# Patient Record
Sex: FEMALE | Race: WHITE | Employment: FULL TIME | URBAN - METROPOLITAN AREA
[De-identification: names, ages, dates, MRNs, and addresses within clinical notes are randomized per-mention and may not be internally consistent; named-entity substitution may affect disease eponyms.]

---

## 2017-01-18 ENCOUNTER — TELEPHONE (OUTPATIENT)
Dept: BARIATRICS/WEIGHT MGMT | Age: 27
End: 2017-01-18

## 2017-03-06 RX ORDER — MONTELUKAST SODIUM 10 MG/1
TABLET ORAL
Qty: 90 TABLET | Refills: 1 | Status: SHIPPED | OUTPATIENT
Start: 2017-03-06 | End: 2018-08-08 | Stop reason: SDUPTHER

## 2017-04-18 RX ORDER — CITALOPRAM 40 MG/1
40 TABLET ORAL DAILY
Qty: 30 TABLET | Refills: 5 | Status: SHIPPED | OUTPATIENT
Start: 2017-04-18 | End: 2018-01-05 | Stop reason: SDUPTHER

## 2017-07-26 ENCOUNTER — OFFICE VISIT (OUTPATIENT)
Dept: FAMILY MEDICINE CLINIC | Age: 27
End: 2017-07-26

## 2017-07-26 VITALS
DIASTOLIC BLOOD PRESSURE: 84 MMHG | WEIGHT: 293 LBS | OXYGEN SATURATION: 98 % | SYSTOLIC BLOOD PRESSURE: 134 MMHG | HEIGHT: 67 IN | BODY MASS INDEX: 45.99 KG/M2 | HEART RATE: 90 BPM

## 2017-07-26 DIAGNOSIS — F41.9 ANXIETY: ICD-10-CM

## 2017-07-26 DIAGNOSIS — H69.82 EUSTACHIAN TUBE DYSFUNCTION, LEFT: ICD-10-CM

## 2017-07-26 DIAGNOSIS — G43.919 INTRACTABLE MIGRAINE WITHOUT STATUS MIGRAINOSUS, UNSPECIFIED MIGRAINE TYPE: ICD-10-CM

## 2017-07-26 DIAGNOSIS — H66.001 ACUTE SUPPURATIVE OTITIS MEDIA OF RIGHT EAR WITHOUT SPONTANEOUS RUPTURE OF TYMPANIC MEMBRANE, RECURRENCE NOT SPECIFIED: Primary | ICD-10-CM

## 2017-07-26 PROCEDURE — 99214 OFFICE O/P EST MOD 30 MIN: CPT | Performed by: NURSE PRACTITIONER

## 2017-07-26 RX ORDER — ALPRAZOLAM 0.5 MG/1
0.5 TABLET ORAL EVERY 8 HOURS PRN
Qty: 30 TABLET | Refills: 0 | Status: SHIPPED | OUTPATIENT
Start: 2017-07-26 | End: 2017-11-17 | Stop reason: SDUPTHER

## 2017-07-26 RX ORDER — RIZATRIPTAN BENZOATE 10 MG/1
10 TABLET ORAL
Qty: 12 TABLET | Refills: 3 | Status: SHIPPED | OUTPATIENT
Start: 2017-07-26 | End: 2018-08-08

## 2017-07-26 RX ORDER — AZITHROMYCIN 250 MG/1
TABLET, FILM COATED ORAL
Qty: 1 PACKET | Refills: 0 | Status: SHIPPED | OUTPATIENT
Start: 2017-07-26 | End: 2017-08-05

## 2017-07-26 RX ORDER — PREDNISONE 20 MG/1
40 TABLET ORAL DAILY
Qty: 10 TABLET | Refills: 0 | Status: SHIPPED | OUTPATIENT
Start: 2017-07-26 | End: 2017-07-31

## 2017-07-26 ASSESSMENT — ENCOUNTER SYMPTOMS
NAUSEA: 0
COUGH: 0
PHOTOPHOBIA: 1
SORE THROAT: 0
RHINORRHEA: 0
SINUS PRESSURE: 0
VOMITING: 0

## 2017-07-26 ASSESSMENT — PATIENT HEALTH QUESTIONNAIRE - PHQ9
SUM OF ALL RESPONSES TO PHQ QUESTIONS 1-9: 2
SUM OF ALL RESPONSES TO PHQ9 QUESTIONS 1 & 2: 2
2. FEELING DOWN, DEPRESSED OR HOPELESS: 0
1. LITTLE INTEREST OR PLEASURE IN DOING THINGS: 1
2. FEELING DOWN, DEPRESSED OR HOPELESS: 1
SUM OF ALL RESPONSES TO PHQ QUESTIONS 1-9: 0
SUM OF ALL RESPONSES TO PHQ9 QUESTIONS 1 & 2: 0
1. LITTLE INTEREST OR PLEASURE IN DOING THINGS: 0

## 2017-11-17 DIAGNOSIS — F41.9 ANXIETY: ICD-10-CM

## 2017-11-17 RX ORDER — ALPRAZOLAM 0.5 MG/1
0.5 TABLET ORAL EVERY 8 HOURS PRN
Qty: 30 TABLET | Refills: 0 | Status: SHIPPED | OUTPATIENT
Start: 2017-11-17 | End: 2018-08-08

## 2018-01-05 RX ORDER — CITALOPRAM 40 MG/1
TABLET ORAL
Qty: 30 TABLET | Refills: 0 | Status: SHIPPED | OUTPATIENT
Start: 2018-01-05 | End: 2018-02-22 | Stop reason: SDUPTHER

## 2018-02-22 DIAGNOSIS — F41.9 ANXIETY: ICD-10-CM

## 2018-02-22 RX ORDER — ALPRAZOLAM 0.5 MG/1
0.5 TABLET ORAL EVERY 8 HOURS PRN
Qty: 30 TABLET | Refills: 0 | OUTPATIENT
Start: 2018-02-22

## 2018-02-22 RX ORDER — CITALOPRAM 40 MG/1
TABLET ORAL
Qty: 30 TABLET | Refills: 0 | Status: SHIPPED | OUTPATIENT
Start: 2018-02-22 | End: 2018-04-09 | Stop reason: SDUPTHER

## 2018-02-22 NOTE — TELEPHONE ENCOUNTER
Last OV  07/26/2017 acute suppurative otitis media of right ear  Xanax  Last refill  11/17/2017 #30 0 refills     celexa   Last Refill  01/05/2018 #30 0 refills

## 2018-04-09 RX ORDER — CITALOPRAM 40 MG/1
TABLET ORAL
Qty: 30 TABLET | Refills: 0 | Status: SHIPPED | OUTPATIENT
Start: 2018-04-09 | End: 2018-05-18 | Stop reason: SDUPTHER

## 2018-05-21 RX ORDER — CITALOPRAM 40 MG/1
TABLET ORAL
Qty: 30 TABLET | Refills: 0 | Status: SHIPPED | OUTPATIENT
Start: 2018-05-21 | End: 2018-08-08 | Stop reason: ALTCHOICE

## 2018-08-08 ENCOUNTER — OFFICE VISIT (OUTPATIENT)
Dept: FAMILY MEDICINE CLINIC | Age: 28
End: 2018-08-08

## 2018-08-08 VITALS
DIASTOLIC BLOOD PRESSURE: 84 MMHG | HEART RATE: 96 BPM | BODY MASS INDEX: 45.99 KG/M2 | SYSTOLIC BLOOD PRESSURE: 114 MMHG | TEMPERATURE: 98.1 F | HEIGHT: 67 IN | WEIGHT: 293 LBS | OXYGEN SATURATION: 97 %

## 2018-08-08 DIAGNOSIS — F32.1 CURRENT MODERATE EPISODE OF MAJOR DEPRESSIVE DISORDER WITHOUT PRIOR EPISODE (HCC): ICD-10-CM

## 2018-08-08 DIAGNOSIS — F41.9 ANXIETY: Primary | ICD-10-CM

## 2018-08-08 PROCEDURE — G0444 DEPRESSION SCREEN ANNUAL: HCPCS | Performed by: FAMILY MEDICINE

## 2018-08-08 PROCEDURE — G8427 DOCREV CUR MEDS BY ELIG CLIN: HCPCS | Performed by: FAMILY MEDICINE

## 2018-08-08 PROCEDURE — 1036F TOBACCO NON-USER: CPT | Performed by: FAMILY MEDICINE

## 2018-08-08 PROCEDURE — G8417 CALC BMI ABV UP PARAM F/U: HCPCS | Performed by: FAMILY MEDICINE

## 2018-08-08 PROCEDURE — 99214 OFFICE O/P EST MOD 30 MIN: CPT | Performed by: FAMILY MEDICINE

## 2018-08-08 RX ORDER — RIZATRIPTAN BENZOATE 10 MG/1
10 TABLET ORAL
Qty: 9 TABLET | Refills: 3 | Status: SHIPPED | OUTPATIENT
Start: 2018-08-08 | End: 2019-02-04 | Stop reason: SDUPTHER

## 2018-08-08 RX ORDER — LORAZEPAM 1 MG/1
1 TABLET ORAL EVERY 6 HOURS PRN
Qty: 20 TABLET | Refills: 0 | Status: SHIPPED | OUTPATIENT
Start: 2018-08-08 | End: 2018-10-30 | Stop reason: SDUPTHER

## 2018-08-08 RX ORDER — ALPRAZOLAM 0.5 MG/1
0.5 TABLET ORAL EVERY 8 HOURS PRN
Qty: 30 TABLET | Refills: 0 | Status: CANCELLED | OUTPATIENT
Start: 2018-08-08 | End: 2018-09-07

## 2018-08-08 RX ORDER — CITALOPRAM 40 MG/1
TABLET ORAL
Qty: 90 TABLET | Refills: 1 | Status: CANCELLED | OUTPATIENT
Start: 2018-08-08

## 2018-08-08 RX ORDER — RIZATRIPTAN BENZOATE 10 MG/1
10 TABLET ORAL
COMMUNITY
End: 2018-08-08 | Stop reason: SDUPTHER

## 2018-08-08 RX ORDER — CITALOPRAM 40 MG/1
TABLET ORAL
Qty: 30 TABLET | Refills: 0 | Status: CANCELLED | OUTPATIENT
Start: 2018-08-08

## 2018-08-08 RX ORDER — MONTELUKAST SODIUM 10 MG/1
TABLET ORAL
Qty: 90 TABLET | Refills: 3 | Status: SHIPPED | OUTPATIENT
Start: 2018-08-08 | End: 2019-02-18 | Stop reason: SDUPTHER

## 2018-08-08 ASSESSMENT — PATIENT HEALTH QUESTIONNAIRE - PHQ9
2. FEELING DOWN, DEPRESSED OR HOPELESS: 3
5. POOR APPETITE OR OVEREATING: 0
4. FEELING TIRED OR HAVING LITTLE ENERGY: 0
7. TROUBLE CONCENTRATING ON THINGS, SUCH AS READING THE NEWSPAPER OR WATCHING TELEVISION: 3
SUM OF ALL RESPONSES TO PHQ QUESTIONS 1-9: 11
6. FEELING BAD ABOUT YOURSELF - OR THAT YOU ARE A FAILURE OR HAVE LET YOURSELF OR YOUR FAMILY DOWN: 1
3. TROUBLE FALLING OR STAYING ASLEEP: 1
SUM OF ALL RESPONSES TO PHQ QUESTIONS 1-9: 11
1. LITTLE INTEREST OR PLEASURE IN DOING THINGS: 0
10. IF YOU CHECKED OFF ANY PROBLEMS, HOW DIFFICULT HAVE THESE PROBLEMS MADE IT FOR YOU TO DO YOUR WORK, TAKE CARE OF THINGS AT HOME, OR GET ALONG WITH OTHER PEOPLE: 1
9. THOUGHTS THAT YOU WOULD BE BETTER OFF DEAD, OR OF HURTING YOURSELF: 0
8. MOVING OR SPEAKING SO SLOWLY THAT OTHER PEOPLE COULD HAVE NOTICED. OR THE OPPOSITE, BEING SO FIGETY OR RESTLESS THAT YOU HAVE BEEN MOVING AROUND A LOT MORE THAN USUAL: 3
SUM OF ALL RESPONSES TO PHQ9 QUESTIONS 1 & 2: 3
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
## 2018-08-08 NOTE — LETTER
supplements and oral decongestants. Dependence withdrawal symptoms may include depressed mood, loss of interest, suicidal thoughts, anxiety, fatigue, appetite changes and agitation. Testosterone replacement therapy:  Potential side effects include increased risk of stroke and heart attack, blood clots, increased blood pressure, increased cholesterol, enlarged prostate, sleep apnea, irritability/aggression and other mood disorders, and decreased fertility. Other:     1. I understand that I have the following responsibilities:  · I will take medications at the dose and frequency prescribed. · I will not increase or change how I take my medications without the approval of the health care provider who signs this Medication Agreement. · I will arrange for refills at the prescribed interval ONLY during regular office hours. I will not ask for refills earlier than agreed, after-hours, on holidays or on weekends. · I will obtain all refills for these medications at  ·  ____________________________________  pharmacy (phone number  ·  ________________________), with full consent for my provider and pharmacist to exchange information in writing or verbally. · I will not request any pain medications or controlled substances from other providers and will inform this provider of all other medications I am taking. · I will inform my other health care providers that I am taking these medications and of the existence of this [place] [id number]. In the event of an emergency, I will provide the same information to the emergency department providers. · I will protect my prescriptions and medications. I understand that lost or misplaced prescriptions will not be replaced. · I will keep medications only for my own use and will not share them with others. I will keep all medications away from children. · I agree to participate in any medical, psychological or psychiatric assessments recommended by my provider. · I will actively participate in any program designed to improve function, including social, physical, psychological and daily or work activities. 2. I will not use illegal or street drugs or another person's prescription. If I have an addiction problem with drugs or alcohol and my provider asks me to enter a program to address this issue, I agree to follow through. Such programs may include:  · 12-Step program and securing a sponsor  · Individual counseling   · Inpatient or outpatient treatment  · Other:_____________________________________________________________________________________________________________________________________________    If in treatment, I will request that a copy of the programs initial evaluation and treatment recommendations be sent to this provider and will not expect refills until that is received. I will also request written monthly updates be sent to this provider to verify my continuing treatment. 3. I will consent to drug screening upon my providers request to assure I am only taking the prescribed drugs, described in this MEDICATION AGREEMENT. I understand that a drug screen is a laboratory test in which a sample of my urine, blood or saliva is checked to see what drugs I have been taking. 4. I agree that I will treat the providers and staff at this office with respect at all times. I will keep all of my scheduled appointments, but if I need to cancel my appointment, I will do so a minimum of 24 hours before it is scheduled. 5. I understand that this provider may stop prescribing the medications listed if:  · I do not show any improvement in pain, or my activity has not improved. · I develop rapid tolerance or loss of improvement, as described in my treatment plan. · I develop significant side effects from the medication.   · My behavior is inconsistent with the responsibilities outlined above,

## 2018-10-30 DIAGNOSIS — F41.9 ANXIETY: ICD-10-CM

## 2018-10-30 RX ORDER — LORAZEPAM 1 MG/1
TABLET ORAL
Qty: 20 TABLET | Refills: 0 | Status: SHIPPED | OUTPATIENT
Start: 2018-10-30 | End: 2019-02-18 | Stop reason: SDUPTHER

## 2018-11-08 ENCOUNTER — HOSPITAL ENCOUNTER (EMERGENCY)
Age: 28
Discharge: HOME | End: 2018-11-08
Attending: EMERGENCY MEDICINE
Payer: COMMERCIAL

## 2018-11-08 VITALS
HEIGHT: 67 IN | HEART RATE: 100 BPM | RESPIRATION RATE: 16 BRPM | BODY MASS INDEX: 45.99 KG/M2 | OXYGEN SATURATION: 100 % | DIASTOLIC BLOOD PRESSURE: 100 MMHG | TEMPERATURE: 98.4 F | WEIGHT: 293 LBS | SYSTOLIC BLOOD PRESSURE: 145 MMHG

## 2018-11-08 DIAGNOSIS — T23.222A PARTIAL THICKNESS BURN OF FINGER OF LEFT HAND, INITIAL ENCOUNTER: Primary | ICD-10-CM

## 2018-11-08 PROCEDURE — 99282 EMERGENCY DEPT VISIT SF MDM: CPT

## 2018-11-08 PROCEDURE — 6370000000 HC RX 637 (ALT 250 FOR IP): Performed by: EMERGENCY MEDICINE

## 2018-11-08 RX ORDER — IBUPROFEN 600 MG/1
600 TABLET ORAL EVERY 6 HOURS PRN
Qty: 30 TABLET | Refills: 0 | Status: SHIPPED | OUTPATIENT
Start: 2018-11-08 | End: 2020-05-08

## 2018-11-08 RX ORDER — HYDROCODONE BITARTRATE AND ACETAMINOPHEN 5; 325 MG/1; MG/1
1 TABLET ORAL EVERY 6 HOURS PRN
Qty: 3 TABLET | Refills: 0 | Status: SHIPPED | OUTPATIENT
Start: 2018-11-08 | End: 2018-11-10

## 2018-11-08 RX ORDER — IBUPROFEN 400 MG/1
800 TABLET ORAL ONCE
Status: COMPLETED | OUTPATIENT
Start: 2018-11-08 | End: 2018-11-08

## 2018-11-08 RX ORDER — HYDROCODONE BITARTRATE AND ACETAMINOPHEN 5; 325 MG/1; MG/1
1 TABLET ORAL ONCE
Status: COMPLETED | OUTPATIENT
Start: 2018-11-08 | End: 2018-11-08

## 2018-11-08 RX ADMIN — HYDROCODONE BITARTRATE AND ACETAMINOPHEN 1 TABLET: 5; 325 TABLET ORAL at 21:53

## 2018-11-08 RX ADMIN — IBUPROFEN 800 MG: 400 TABLET, FILM COATED ORAL at 21:53

## 2018-11-08 ASSESSMENT — PAIN DESCRIPTION - ORIENTATION: ORIENTATION: LEFT

## 2018-11-08 ASSESSMENT — PAIN DESCRIPTION - DESCRIPTORS: DESCRIPTORS: BURNING

## 2018-11-08 ASSESSMENT — PAIN DESCRIPTION - LOCATION: LOCATION: FINGER (COMMENT WHICH ONE)

## 2018-11-08 ASSESSMENT — PAIN SCALES - GENERAL
PAINLEVEL_OUTOF10: 10
PAINLEVEL_OUTOF10: 8

## 2018-11-08 ASSESSMENT — PAIN DESCRIPTION - PAIN TYPE: TYPE: ACUTE PAIN

## 2018-11-16 ENCOUNTER — HOSPITAL ENCOUNTER (EMERGENCY)
Age: 28
Discharge: HOME | End: 2018-11-16
Attending: EMERGENCY MEDICINE
Payer: COMMERCIAL

## 2018-11-16 VITALS
TEMPERATURE: 97.8 F | RESPIRATION RATE: 17 BRPM | HEART RATE: 90 BPM | DIASTOLIC BLOOD PRESSURE: 90 MMHG | BODY MASS INDEX: 45.99 KG/M2 | WEIGHT: 293 LBS | HEIGHT: 67 IN | OXYGEN SATURATION: 98 % | SYSTOLIC BLOOD PRESSURE: 144 MMHG

## 2018-11-16 DIAGNOSIS — R03.0 ELEVATED BLOOD PRESSURE READING: ICD-10-CM

## 2018-11-16 DIAGNOSIS — K60.2 ANAL FISSURE: Primary | ICD-10-CM

## 2018-11-16 LAB
BILIRUBIN URINE: NEGATIVE
BLOOD, URINE: NEGATIVE
CLARITY: ABNORMAL
COLOR: YELLOW
GLUCOSE URINE: NEGATIVE MG/DL
KETONES, URINE: NEGATIVE MG/DL
LEUKOCYTE ESTERASE, URINE: NEGATIVE
MICROSCOPIC EXAMINATION: ABNORMAL
NITRITE, URINE: NEGATIVE
PH UA: 6
PROTEIN UA: NEGATIVE MG/DL
SPECIFIC GRAVITY UA: 1.02
URINE REFLEX TO CULTURE: ABNORMAL
URINE TYPE: ABNORMAL
UROBILINOGEN, URINE: 0.2 E.U./DL

## 2018-11-16 PROCEDURE — 81003 URINALYSIS AUTO W/O SCOPE: CPT

## 2018-11-16 PROCEDURE — 99283 EMERGENCY DEPT VISIT LOW MDM: CPT

## 2018-11-16 RX ORDER — DOCUSATE SODIUM 100 MG/1
100 CAPSULE, LIQUID FILLED ORAL 2 TIMES DAILY
Qty: 30 CAPSULE | Refills: 1 | Status: SHIPPED | OUTPATIENT
Start: 2018-11-16 | End: 2018-12-01

## 2018-11-16 NOTE — ED NOTES
Patient verbalized understanding of d/c instructions. Patient given scripts x 2. Patient left the ED and instructed on follow up.         Franki George RN  11/16/18 2294

## 2019-01-15 ENCOUNTER — HOSPITAL ENCOUNTER (EMERGENCY)
Age: 29
Discharge: HOME | End: 2019-01-15
Attending: EMERGENCY MEDICINE
Payer: COMMERCIAL

## 2019-01-15 VITALS
HEART RATE: 111 BPM | DIASTOLIC BLOOD PRESSURE: 96 MMHG | TEMPERATURE: 99.9 F | SYSTOLIC BLOOD PRESSURE: 148 MMHG | BODY MASS INDEX: 45.99 KG/M2 | WEIGHT: 293 LBS | RESPIRATION RATE: 18 BRPM | HEIGHT: 67 IN | OXYGEN SATURATION: 99 %

## 2019-01-15 DIAGNOSIS — J02.9 ACUTE PHARYNGITIS, UNSPECIFIED ETIOLOGY: Primary | ICD-10-CM

## 2019-01-15 PROCEDURE — 6370000000 HC RX 637 (ALT 250 FOR IP): Performed by: EMERGENCY MEDICINE

## 2019-01-15 PROCEDURE — 99282 EMERGENCY DEPT VISIT SF MDM: CPT

## 2019-01-15 RX ORDER — DOXYCYCLINE 100 MG/1
100 CAPSULE ORAL ONCE
Status: COMPLETED | OUTPATIENT
Start: 2019-01-15 | End: 2019-01-15

## 2019-01-15 RX ORDER — DOXYCYCLINE 100 MG/1
100 TABLET ORAL 2 TIMES DAILY
Qty: 20 TABLET | Refills: 0 | Status: SHIPPED | OUTPATIENT
Start: 2019-01-15 | End: 2019-01-25

## 2019-01-15 RX ADMIN — DOXYCYCLINE 100 MG: 100 CAPSULE ORAL at 21:07

## 2019-01-15 ASSESSMENT — PAIN DESCRIPTION - PAIN TYPE
TYPE: ACUTE PAIN
TYPE: ACUTE PAIN

## 2019-01-15 ASSESSMENT — PAIN DESCRIPTION - LOCATION
LOCATION: THROAT
LOCATION: THROAT

## 2019-01-15 ASSESSMENT — PAIN - FUNCTIONAL ASSESSMENT
PAIN_FUNCTIONAL_ASSESSMENT: 0-10
PAIN_FUNCTIONAL_ASSESSMENT: 0-10

## 2019-01-15 ASSESSMENT — PAIN SCALES - GENERAL
PAINLEVEL_OUTOF10: 7
PAINLEVEL_OUTOF10: 8
PAINLEVEL_OUTOF10: 6

## 2019-01-15 ASSESSMENT — PAIN DESCRIPTION - DESCRIPTORS
DESCRIPTORS: ACHING
DESCRIPTORS: ACHING;BURNING

## 2019-01-15 ASSESSMENT — PAIN DESCRIPTION - FREQUENCY: FREQUENCY: CONTINUOUS

## 2019-01-15 ASSESSMENT — PAIN DESCRIPTION - ORIENTATION: ORIENTATION: LEFT

## 2019-02-04 ENCOUNTER — HOSPITAL ENCOUNTER (OUTPATIENT)
Dept: GENERAL RADIOLOGY | Age: 29
Discharge: HOME | End: 2019-02-04
Payer: COMMERCIAL

## 2019-02-04 ENCOUNTER — OFFICE VISIT (OUTPATIENT)
Dept: FAMILY MEDICINE CLINIC | Age: 29
End: 2019-02-04
Payer: COMMERCIAL

## 2019-02-04 ENCOUNTER — HOSPITAL ENCOUNTER (OUTPATIENT)
Age: 29
Discharge: HOME | End: 2019-02-04
Payer: COMMERCIAL

## 2019-02-04 VITALS
SYSTOLIC BLOOD PRESSURE: 122 MMHG | TEMPERATURE: 98.4 F | HEART RATE: 87 BPM | BODY MASS INDEX: 45.99 KG/M2 | WEIGHT: 293 LBS | OXYGEN SATURATION: 95 % | DIASTOLIC BLOOD PRESSURE: 82 MMHG | HEIGHT: 67 IN

## 2019-02-04 DIAGNOSIS — G43.109 MIGRAINE WITH AURA AND WITHOUT STATUS MIGRAINOSUS, NOT INTRACTABLE: ICD-10-CM

## 2019-02-04 DIAGNOSIS — R05.9 COUGH: Primary | ICD-10-CM

## 2019-02-04 DIAGNOSIS — R05.9 COUGH: ICD-10-CM

## 2019-02-04 PROCEDURE — G8417 CALC BMI ABV UP PARAM F/U: HCPCS | Performed by: FAMILY MEDICINE

## 2019-02-04 PROCEDURE — 71046 X-RAY EXAM CHEST 2 VIEWS: CPT

## 2019-02-04 PROCEDURE — G8427 DOCREV CUR MEDS BY ELIG CLIN: HCPCS | Performed by: FAMILY MEDICINE

## 2019-02-04 PROCEDURE — G8484 FLU IMMUNIZE NO ADMIN: HCPCS | Performed by: FAMILY MEDICINE

## 2019-02-04 PROCEDURE — 99213 OFFICE O/P EST LOW 20 MIN: CPT | Performed by: FAMILY MEDICINE

## 2019-02-04 PROCEDURE — 1036F TOBACCO NON-USER: CPT | Performed by: FAMILY MEDICINE

## 2019-02-04 RX ORDER — RIZATRIPTAN BENZOATE 10 MG/1
10 TABLET ORAL
Qty: 9 TABLET | Refills: 3 | Status: SHIPPED | OUTPATIENT
Start: 2019-02-04 | End: 2020-05-07

## 2019-02-04 RX ORDER — AZITHROMYCIN 250 MG/1
TABLET, FILM COATED ORAL
Qty: 6 TABLET | Refills: 0 | Status: SHIPPED | OUTPATIENT
Start: 2019-02-04 | End: 2019-02-14

## 2019-02-04 RX ORDER — PROMETHAZINE HYDROCHLORIDE AND CODEINE PHOSPHATE 6.25; 1 MG/5ML; MG/5ML
5 SYRUP ORAL 4 TIMES DAILY PRN
Qty: 120 ML | Refills: 0 | Status: SHIPPED | OUTPATIENT
Start: 2019-02-04 | End: 2019-02-07

## 2019-02-04 RX ORDER — ALBUTEROL SULFATE 90 UG/1
2 AEROSOL, METERED RESPIRATORY (INHALATION) EVERY 4 HOURS PRN
Qty: 1 INHALER | Refills: 2 | Status: SHIPPED | OUTPATIENT
Start: 2019-02-04 | End: 2020-03-24

## 2019-02-04 ASSESSMENT — ENCOUNTER SYMPTOMS
COUGH: 1
VOMITING: 0
WHEEZING: 1
SINUS PRESSURE: 1
RHINORRHEA: 1
SHORTNESS OF BREATH: 1
SORE THROAT: 0
DIARRHEA: 0
NAUSEA: 0

## 2019-02-18 DIAGNOSIS — F41.9 ANXIETY: ICD-10-CM

## 2019-02-18 RX ORDER — LORAZEPAM 1 MG/1
TABLET ORAL
Qty: 20 TABLET | Refills: 0 | Status: SHIPPED | OUTPATIENT
Start: 2019-02-18 | End: 2019-03-20

## 2019-02-18 RX ORDER — MONTELUKAST SODIUM 10 MG/1
TABLET ORAL
Qty: 90 TABLET | Refills: 0 | Status: SHIPPED | OUTPATIENT
Start: 2019-02-18 | End: 2019-12-30

## 2019-04-30 ENCOUNTER — E-VISIT (OUTPATIENT)
Dept: FAMILY MEDICINE CLINIC | Age: 29
End: 2019-04-30
Payer: COMMERCIAL

## 2019-04-30 DIAGNOSIS — J06.9 URI WITH COUGH AND CONGESTION: Primary | ICD-10-CM

## 2019-04-30 DIAGNOSIS — R05.9 COUGH: ICD-10-CM

## 2019-04-30 PROCEDURE — 98969 PR NONPHYSICIAN ONLINE ASSESSMENT AND MANAGEMENT: CPT | Performed by: NURSE PRACTITIONER

## 2019-04-30 RX ORDER — BENZONATATE 200 MG/1
200 CAPSULE ORAL 3 TIMES DAILY PRN
Qty: 30 CAPSULE | Refills: 0 | Status: SHIPPED | OUTPATIENT
Start: 2019-04-30 | End: 2019-05-07

## 2019-04-30 RX ORDER — AZITHROMYCIN 250 MG/1
TABLET, FILM COATED ORAL
Qty: 6 TABLET | Refills: 0 | Status: SHIPPED | OUTPATIENT
Start: 2019-04-30 | End: 2020-05-08

## 2019-04-30 ASSESSMENT — LIFESTYLE VARIABLES: SMOKING_STATUS: NO, I'VE NEVER SMOKED

## 2019-05-07 ENCOUNTER — E-VISIT (OUTPATIENT)
Dept: FAMILY MEDICINE CLINIC | Age: 29
End: 2019-05-07

## 2019-05-07 PROCEDURE — 98969 PR NONPHYSICIAN ONLINE ASSESSMENT AND MANAGEMENT: CPT | Performed by: PHYSICIAN ASSISTANT

## 2019-05-07 RX ORDER — BROMPHENIRAMINE MALEATE, PSEUDOEPHEDRINE HYDROCHLORIDE, AND DEXTROMETHORPHAN HYDROBROMIDE 2; 30; 10 MG/5ML; MG/5ML; MG/5ML
10 SYRUP ORAL 4 TIMES DAILY PRN
Qty: 200 ML | Refills: 0 | Status: SHIPPED | OUTPATIENT
Start: 2019-05-07 | End: 2020-05-08

## 2019-05-07 ASSESSMENT — LIFESTYLE VARIABLES: SMOKING_STATUS: NO, I'VE NEVER SMOKED

## 2019-05-08 ENCOUNTER — PATIENT MESSAGE (OUTPATIENT)
Dept: FAMILY MEDICINE CLINIC | Age: 29
End: 2019-05-08

## 2019-05-08 NOTE — TELEPHONE ENCOUNTER
From: Ham Solitario  To: Rehan Do MD  Sent: 5/8/2019 11:25 AM EDT  Subject: Visit Follow-Up Question    Hello,    I was seen in an evisit yesterday and a doctor's note was requested to be faxed to my work to advise I am able to return to work today. However my work never received the fax. Can you please fax the doctors note to [phone] attn Domingo Del Real?

## 2019-06-05 ENCOUNTER — E-VISIT (OUTPATIENT)
Dept: FAMILY MEDICINE CLINIC | Age: 29
End: 2019-06-05
Payer: COMMERCIAL

## 2019-06-05 PROCEDURE — 98969 PR NONPHYSICIAN ONLINE ASSESSMENT AND MANAGEMENT: CPT | Performed by: NURSE PRACTITIONER

## 2019-06-05 RX ORDER — POLYMYXIN B SULFATE AND TRIMETHOPRIM 1; 10000 MG/ML; [USP'U]/ML
1 SOLUTION OPHTHALMIC EVERY 4 HOURS
Qty: 1 BOTTLE | Refills: 0 | Status: SHIPPED | OUTPATIENT
Start: 2019-06-05 | End: 2019-06-15

## 2019-06-05 NOTE — PROGRESS NOTES
Antibiotic drops were sent to [facility].   Please fax a work excuse for Cherise 3 and 4th to [phone]

## 2019-12-30 RX ORDER — MONTELUKAST SODIUM 10 MG/1
TABLET ORAL
Qty: 90 TABLET | Refills: 0 | Status: SHIPPED | OUTPATIENT
Start: 2019-12-30 | End: 2020-05-07

## 2020-02-03 ENCOUNTER — E-VISIT (OUTPATIENT)
Dept: FAMILY MEDICINE CLINIC | Age: 30
End: 2020-02-03

## 2020-02-03 PROCEDURE — 99421 OL DIG E/M SVC 5-10 MIN: CPT | Performed by: FAMILY MEDICINE

## 2020-02-03 ASSESSMENT — LIFESTYLE VARIABLES
SMOKING_STATUS: NO, I'M A FORMER SMOKER
PACKS_PER_DAY: .5
SMOKING_YEARS: 2

## 2020-02-04 ENCOUNTER — TELEPHONE (OUTPATIENT)
Dept: FAMILY MEDICINE CLINIC | Age: 30
End: 2020-02-04

## 2020-03-24 NOTE — TELEPHONE ENCOUNTER
Medication:   Requested Prescriptions     Pending Prescriptions Disp Refills    VENTOLIN  (90 Base) MCG/ACT inhaler [Pharmacy Med Name: VENTOLIN HFA INH W/DOS CTR 200PUFFS] 18 g      Sig: INHALE 2 PUFFS INTO THE LUNGS EVERY 4 HOURS AS NEEDED FOR WHEEZING        Last Filled:  02/04/2019 #1 2rf     Patient Phone Number: [phone] (home)     Last appt: 2/4/2019   Next appt: Visit date not found    Last OARRS:   RX Monitoring 7/26/2017   Attestation The Prescription Monitoring Report for this patient was reviewed today. Periodic Controlled Substance Monitoring Possible medication side effects, risk of tolerance and/or dependence, and alternative treatments discussed; No signs of potential drug abuse or diversion identified.

## 2020-04-20 ENCOUNTER — E-VISIT (OUTPATIENT)
Dept: FAMILY MEDICINE CLINIC | Age: 30
End: 2020-04-20

## 2020-04-20 PROCEDURE — 99421 OL DIG E/M SVC 5-10 MIN: CPT | Performed by: FAMILY MEDICINE

## 2020-04-20 RX ORDER — FLUTICASONE PROPIONATE 50 MCG
2 SPRAY, SUSPENSION (ML) NASAL DAILY
Qty: 1 BOTTLE | Refills: 3 | Status: SHIPPED | OUTPATIENT
Start: 2020-04-20 | End: 2021-08-18

## 2020-04-20 RX ORDER — CLINDAMYCIN HYDROCHLORIDE 300 MG/1
300 CAPSULE ORAL 2 TIMES DAILY
Qty: 14 CAPSULE | Refills: 0 | Status: SHIPPED | OUTPATIENT
Start: 2020-04-20 | End: 2020-04-27

## 2020-04-20 RX ORDER — LORATADINE 10 MG/1
10 TABLET ORAL DAILY
Qty: 30 TABLET | Refills: 1 | Status: SHIPPED | OUTPATIENT
Start: 2020-04-20 | End: 2020-12-04 | Stop reason: SDUPTHER

## 2020-04-20 ASSESSMENT — LIFESTYLE VARIABLES
PACKS_PER_DAY: 0
SMOKING_YEARS: 4
SMOKING_STATUS: NO, I'M A FORMER SMOKER

## 2020-04-22 ENCOUNTER — TELEPHONE (OUTPATIENT)
Dept: FAMILY MEDICINE CLINIC | Age: 30
End: 2020-04-22

## 2020-05-08 ENCOUNTER — VIRTUAL VISIT (OUTPATIENT)
Dept: FAMILY MEDICINE CLINIC | Age: 30
End: 2020-05-08
Payer: COMMERCIAL

## 2020-05-08 ENCOUNTER — TELEPHONE (OUTPATIENT)
Dept: FAMILY MEDICINE CLINIC | Age: 30
End: 2020-05-08

## 2020-05-08 PROCEDURE — 1036F TOBACCO NON-USER: CPT | Performed by: FAMILY MEDICINE

## 2020-05-08 PROCEDURE — 99214 OFFICE O/P EST MOD 30 MIN: CPT | Performed by: FAMILY MEDICINE

## 2020-05-08 PROCEDURE — G8427 DOCREV CUR MEDS BY ELIG CLIN: HCPCS | Performed by: FAMILY MEDICINE

## 2020-05-08 PROCEDURE — G8421 BMI NOT CALCULATED: HCPCS | Performed by: FAMILY MEDICINE

## 2020-05-08 NOTE — PROGRESS NOTES
Megan Arreola is a 34 y.o. female. HPI:  Due to the current coronavirus pandemic, this telephone/video visit was insisted, with patient's consent, to reduce the patient's risk of exposure to COVID-19 and provide continuity of care for an established patient. The patient was at home while the provider was either at home or at the clinic. Services were provided through a synchronous discussion over the telephone and/or video chat to substitute for in person clinic visit, and coded as such. Video visit with the patient for med review and refills    Struggling with some anxiety but only on 50 mg of Zoloft  Patient seems to be fairly well controlled  Working on  healthy diet and more exercise    Headaches are fairly well controlled, since she is eating better, only having to use her migraine medicine once a month    Due for fasting labs, recommend fasting blood work and physical with Dr. Malathi Dean this summer    Last office visit with me was August 2018    Meds, vitamins and allergies reviewed with pt  Wt Readings from Last 3 Encounters:   02/04/19 (!) 361 lb (163.7 kg)   01/15/19 (!) 367 lb 2 oz (166.5 kg)   11/16/18 (!) 366 lb 9.6 oz (166.3 kg)       REVIEW OF SYSTEMS:   CONSTITUTIONAL: See history of present illness,   Weight noted   HEENT: No new vision difficulties or ringing in the ears. RESPIRATORY: No new SOB, PND, orthopnea or cough. CARDIOVASCULAR: no CP, palpitations or SOB with exertion  GI: No nausea, vomiting, diarrhea, constipation, abdominal pain or changes in bowel habits. : No urinary frequency, urgency, incontinence hematuria or dysuria. SKIN: No cyanosis or skin lesions. MUSCULOSKELETAL: No new muscle or joint pain. NEUROLOGICAL: No syncope or TIA-like symptoms. PSYCHIATRIC: see HPI    Allergies   Allergen Reactions    Ceclor [Cefaclor]     Cephalosporins     Penicillins        Prior to Visit Medications    Medication Sig Taking?  Authorizing Provider   sertraline (ZOLOFT) 50

## 2020-07-24 ENCOUNTER — TELEPHONE (OUTPATIENT)
Dept: FAMILY MEDICINE CLINIC | Age: 30
End: 2020-07-24

## 2020-07-24 ENCOUNTER — E-VISIT (OUTPATIENT)
Dept: FAMILY MEDICINE CLINIC | Age: 30
End: 2020-07-24
Payer: COMMERCIAL

## 2020-07-24 PROCEDURE — 99422 OL DIG E/M SVC 11-20 MIN: CPT | Performed by: FAMILY MEDICINE

## 2020-07-24 RX ORDER — LORATADINE 10 MG/1
10 TABLET ORAL DAILY
Qty: 30 TABLET | Refills: 0 | Status: SHIPPED | OUTPATIENT
Start: 2020-07-24 | End: 2021-08-18

## 2020-07-24 RX ORDER — FLUTICASONE PROPIONATE 50 MCG
2 SPRAY, SUSPENSION (ML) NASAL DAILY
Qty: 1 BOTTLE | Refills: 1 | Status: SHIPPED | OUTPATIENT
Start: 2020-07-24 | End: 2021-08-18

## 2020-07-24 RX ORDER — AZITHROMYCIN 250 MG/1
250 TABLET, FILM COATED ORAL SEE ADMIN INSTRUCTIONS
Qty: 6 TABLET | Refills: 0 | Status: SHIPPED | OUTPATIENT
Start: 2020-07-24 | End: 2020-07-29

## 2020-07-24 ASSESSMENT — LIFESTYLE VARIABLES
PACKS_PER_DAY: .5
SMOKING_YEARS: 2
SMOKING_STATUS: NO, I'M A FORMER SMOKER

## 2020-07-24 NOTE — TELEPHONE ENCOUNTER
Called and left message on machine, waiting for return call. Dr. Sherlyn Fung wanted to let patient know that she flonase, zpak, and claritin to pharmacy. Per Dr. Sherlyn Fung patient should start taking medications today unless she has COVID symptom - cough, SOB, fever and body aches. If patient does have symptoms she should be checked for COVID.

## 2020-07-24 NOTE — PROGRESS NOTES
URI protracted and  Postnasal drip     Flonase plus Claritin and Z-Alber    Okay to return to work on Monday as long as she has had no covert exposure and is improving    Follow-up if symptoms persist or worsen

## 2020-07-28 NOTE — TELEPHONE ENCOUNTER
Pt has been on the meds you sent, and is feeling much better. No Covid symptoms and she is still self quarantining.  FYi closing encounter

## 2020-08-25 ENCOUNTER — VIRTUAL VISIT (OUTPATIENT)
Dept: FAMILY MEDICINE CLINIC | Age: 30
End: 2020-08-25
Payer: COMMERCIAL

## 2020-08-25 PROCEDURE — 99213 OFFICE O/P EST LOW 20 MIN: CPT | Performed by: FAMILY MEDICINE

## 2020-08-25 PROCEDURE — G8427 DOCREV CUR MEDS BY ELIG CLIN: HCPCS | Performed by: FAMILY MEDICINE

## 2020-08-25 RX ORDER — TIZANIDINE 4 MG/1
4 TABLET ORAL EVERY 8 HOURS PRN
Qty: 10 TABLET | Refills: 1 | Status: SHIPPED | OUTPATIENT
Start: 2020-08-25 | End: 2020-12-04
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
## 2020-09-10 ENCOUNTER — E-VISIT (OUTPATIENT)
Dept: FAMILY MEDICINE CLINIC | Age: 30
End: 2020-09-10
Payer: COMMERCIAL

## 2020-09-10 PROCEDURE — 99421 OL DIG E/M SVC 5-10 MIN: CPT | Performed by: FAMILY MEDICINE

## 2020-09-10 RX ORDER — SULFAMETHOXAZOLE AND TRIMETHOPRIM 800; 160 MG/1; MG/1
1 TABLET ORAL 2 TIMES DAILY
Qty: 10 TABLET | Refills: 0 | Status: SHIPPED | OUTPATIENT
Start: 2020-09-10 | End: 2020-09-15

## 2020-09-23 RX ORDER — MONTELUKAST SODIUM 10 MG/1
TABLET ORAL
Qty: 90 TABLET | Refills: 0 | Status: SHIPPED | OUTPATIENT
Start: 2020-09-23 | End: 2021-08-18 | Stop reason: SDUPTHER

## 2020-09-23 NOTE — TELEPHONE ENCOUNTER
Medication:   Requested Prescriptions     Pending Prescriptions Disp Refills    montelukast (SINGULAIR) 10 MG tablet 90 tablet 0     Sig: TAKE 1 TABLET BY MOUTH EVERY DAY        Last Filled:  5/7/2020 #90 0rf    Patient Phone Number: [phone] (home)     Last appt: 9/10/2020   Next appt: Visit date not found    Last OARRS:   RX Monitoring 7/26/2017   Attestation The Prescription Monitoring Report for this patient was reviewed today. Periodic Controlled Substance Monitoring Possible medication side effects, risk of tolerance and/or dependence, and alternative treatments discussed; No signs of potential drug abuse or diversion identified.

## 2020-11-01 ENCOUNTER — E-VISIT (OUTPATIENT)
Dept: FAMILY MEDICINE CLINIC | Age: 30
End: 2020-11-01
Payer: COMMERCIAL

## 2020-11-01 PROCEDURE — 99421 OL DIG E/M SVC 5-10 MIN: CPT | Performed by: FAMILY MEDICINE

## 2020-11-01 RX ORDER — TIZANIDINE 4 MG/1
4 TABLET ORAL EVERY 8 HOURS PRN
Qty: 10 TABLET | Refills: 0 | Status: SHIPPED | OUTPATIENT
Start: 2020-11-01 | End: 2020-12-04

## 2020-11-16 ENCOUNTER — TELEPHONE (OUTPATIENT)
Dept: FAMILY MEDICINE CLINIC | Age: 30
End: 2020-11-16

## 2020-11-16 ENCOUNTER — OFFICE VISIT (OUTPATIENT)
Dept: FAMILY MEDICINE CLINIC | Age: 30
End: 2020-11-16
Payer: COMMERCIAL

## 2020-11-16 ENCOUNTER — NURSE TRIAGE (OUTPATIENT)
Dept: OTHER | Facility: CLINIC | Age: 30
End: 2020-11-16

## 2020-11-16 VITALS
HEIGHT: 67 IN | DIASTOLIC BLOOD PRESSURE: 80 MMHG | TEMPERATURE: 97.2 F | WEIGHT: 293 LBS | OXYGEN SATURATION: 98 % | BODY MASS INDEX: 45.99 KG/M2 | HEART RATE: 96 BPM | SYSTOLIC BLOOD PRESSURE: 124 MMHG

## 2020-11-16 PROCEDURE — 90471 IMMUNIZATION ADMIN: CPT | Performed by: FAMILY MEDICINE

## 2020-11-16 PROCEDURE — 1036F TOBACCO NON-USER: CPT | Performed by: FAMILY MEDICINE

## 2020-11-16 PROCEDURE — G8427 DOCREV CUR MEDS BY ELIG CLIN: HCPCS | Performed by: FAMILY MEDICINE

## 2020-11-16 PROCEDURE — G8417 CALC BMI ABV UP PARAM F/U: HCPCS | Performed by: FAMILY MEDICINE

## 2020-11-16 PROCEDURE — 99214 OFFICE O/P EST MOD 30 MIN: CPT | Performed by: FAMILY MEDICINE

## 2020-11-16 PROCEDURE — 90686 IIV4 VACC NO PRSV 0.5 ML IM: CPT | Performed by: FAMILY MEDICINE

## 2020-11-16 PROCEDURE — G8482 FLU IMMUNIZE ORDER/ADMIN: HCPCS | Performed by: FAMILY MEDICINE

## 2020-11-16 RX ORDER — TRAMADOL HYDROCHLORIDE 50 MG/1
50 TABLET ORAL EVERY 6 HOURS PRN
Qty: 28 TABLET | Refills: 0 | Status: SHIPPED | OUTPATIENT
Start: 2020-11-16 | End: 2020-11-23

## 2020-11-16 RX ORDER — TOPIRAMATE 50 MG/1
50 TABLET, FILM COATED ORAL NIGHTLY
Qty: 60 TABLET | Refills: 3 | Status: SHIPPED | OUTPATIENT
Start: 2020-11-16 | End: 2021-08-18

## 2020-11-16 RX ORDER — RIZATRIPTAN BENZOATE 10 MG/1
TABLET ORAL
Qty: 9 TABLET | Refills: 2 | Status: SHIPPED | OUTPATIENT
Start: 2020-11-16

## 2020-11-16 NOTE — LETTER
[address]  Phone: [phone]  Fax: [phone]    Phil Viera MD        November 16, 2020     Patient: Merlin Morillo   YOB: 1990   Date of Visit: 11/16/2020       To Whom It May Concern: It is my medical opinion that Rommel Gerber struggles with chronic migrane headaches. She has been referred to neurologist for help with headache control. Headaches have cause missed work day for the past year . If you have any questions or concerns, please don't hesitate to call.     Sincerely,        Phil Viera MD

## 2020-11-16 NOTE — TELEPHONE ENCOUNTER
Reason for Disposition   SEVERE headache (e.g., excruciating) and has had severe headaches before    Answer Assessment - Initial Assessment Questions  1. LOCATION: \"Where does it hurt? \"       Left side    2. ONSET: \"When did the headache start? \" (Minutes, hours or days)       2 days      3. PATTERN: \"Does the pain come and go, or has it been constant since it started? \"      Constant     4. SEVERITY: \"How bad is the pain? \" and \"What does it keep you from doing? \"  (e.g., Scale 1-10; mild, moderate, or severe)    - MILD (1-3): doesn't interfere with normal activities     - MODERATE (4-7): interferes with normal activities or awakens from sleep     - SEVERE (8-10): excruciating pain, unable to do any normal activities         7/10    5. RECURRENT SYMPTOM: \"Have you ever had headaches before? \" If so, ask: \"When was the last time? \" and \"What happened that time? \"       Has hx of idiopathic intercranial HTN    6. CAUSE: \"What do you think is causing the headache? \"      idiopathic intercranial HTN    7. MIGRAINE: \"Have you been diagnosed with migraine headaches? \" If so, ask: \"Is this headache similar? \"       Yes    8. HEAD INJURY: \"Has there been any recent injury to the head? \"       Denies    9. OTHER SYMPTOMS: \"Do you have any other symptoms? \" (fever, stiff neck, eye pain, sore throat, cold symptoms)      Loss of vision in left eye, can see some at times, but varies. 10. PREGNANCY: \"Is there any chance you are pregnant? \" \"When was your last menstrual period? \"        LMP    Protocols used: HEADACHE-ADULT-[place]    Pt reports left side headache and left eye vision loss and visual changes for 2 days. Pt reports having this is in the past. PT reports dx of idiopathic intercranial HTN. Reviewed for pt to be seen today with PCP or go to [facility] if no appts available. Warm transfer to [facility] in [address] [place] [address] provided care advice and instructed to call back with worsening symptoms.          Attention Provider: Thank you for allowing me to participate in the care of your patient. The patient was connected to triage in response to information provided to the [facility]. Please do not respond through this encounter as the response is not directed to a shared pool.
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
## 2020-11-16 NOTE — TELEPHONE ENCOUNTER
----- Message from Sabine Samano sent at 11/16/2020 10:21 AM EST -----  Subject: Message to Provider    QUESTIONS  Information for Provider? Patient needs assistance with [place] paperwork for   her job.  ---------------------------------------------------------------------------  --------------  Abi MONTÑAO  What is the best way for the office to contact you? OK to leave message on   voicemail  Preferred Call Back Phone Number? [phone]  ---------------------------------------------------------------------------  --------------  SCRIPT ANSWERS  Relationship to Patient?  Self

## 2020-11-16 NOTE — TELEPHONE ENCOUNTER
Pt has been sent by nurse triage for migraine and vision issues. Screened green. No availability on schedule. Please advise pt on what time she can come in today.

## 2020-11-17 ENCOUNTER — TELEPHONE (OUTPATIENT)
Dept: FAMILY MEDICINE CLINIC | Age: 30
End: 2020-11-17

## 2020-12-01 ENCOUNTER — TELEPHONE (OUTPATIENT)
Dept: FAMILY MEDICINE CLINIC | Age: 30
End: 2020-12-01

## 2020-12-01 NOTE — TELEPHONE ENCOUNTER
Patient will be faxing over forms for her employer. She states that she faxed them last week, but we did not receive them. She needs these completed by Friday, December 4.
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
## 2020-12-04 ENCOUNTER — OFFICE VISIT (OUTPATIENT)
Dept: FAMILY MEDICINE CLINIC | Age: 30
End: 2020-12-04
Payer: COMMERCIAL

## 2020-12-04 VITALS
HEART RATE: 87 BPM | BODY MASS INDEX: 45.99 KG/M2 | DIASTOLIC BLOOD PRESSURE: 82 MMHG | SYSTOLIC BLOOD PRESSURE: 122 MMHG | TEMPERATURE: 97 F | HEIGHT: 67 IN | WEIGHT: 293 LBS | OXYGEN SATURATION: 98 %

## 2020-12-04 PROCEDURE — 99213 OFFICE O/P EST LOW 20 MIN: CPT | Performed by: FAMILY MEDICINE

## 2020-12-04 PROCEDURE — G8417 CALC BMI ABV UP PARAM F/U: HCPCS | Performed by: FAMILY MEDICINE

## 2020-12-04 PROCEDURE — 1036F TOBACCO NON-USER: CPT | Performed by: FAMILY MEDICINE

## 2020-12-04 PROCEDURE — G8427 DOCREV CUR MEDS BY ELIG CLIN: HCPCS | Performed by: FAMILY MEDICINE

## 2020-12-04 PROCEDURE — G8482 FLU IMMUNIZE ORDER/ADMIN: HCPCS | Performed by: FAMILY MEDICINE

## 2020-12-04 RX ORDER — LORATADINE 10 MG/1
10 TABLET ORAL DAILY
Qty: 90 TABLET | Refills: 1 | Status: SHIPPED | OUTPATIENT
Start: 2020-12-04 | End: 2022-05-04 | Stop reason: ALTCHOICE

## 2021-08-13 DIAGNOSIS — F32.A ANXIETY AND DEPRESSION: ICD-10-CM

## 2021-08-13 DIAGNOSIS — F41.9 ANXIETY AND DEPRESSION: ICD-10-CM

## 2021-08-13 NOTE — TELEPHONE ENCOUNTER
Medication:   Requested Prescriptions     Pending Prescriptions Disp Refills    sertraline (ZOLOFT) 50 MG tablet 180 tablet 3     Sig: Take 2 tablets by mouth daily        Last Filled:  05/08/2020    Patient Phone Number: [phone] (home)     Last appt: 12/4/2020   Next appt: Visit date not found    Last OARRS:   RX Monitoring 7/26/2017   Attestation The Prescription Monitoring Report for this patient was reviewed today. Periodic Controlled Substance Monitoring Possible medication side effects, risk of tolerance and/or dependence, and alternative treatments discussed; No signs of potential drug abuse or diversion identified.

## 2021-08-14 DIAGNOSIS — F32.A ANXIETY AND DEPRESSION: ICD-10-CM

## 2021-08-14 DIAGNOSIS — F41.9 ANXIETY AND DEPRESSION: ICD-10-CM

## 2021-08-16 NOTE — TELEPHONE ENCOUNTER
Medication:   Requested Prescriptions     Pending Prescriptions Disp Refills    sertraline (ZOLOFT) 50 MG tablet [Pharmacy Med Name: Sertraline HCl Oral Tablet 50 MG] 180 tablet 0     Sig: TAKE 2 TABLETS BY MOUTH ONE TIME A DAY        Last Filled:  08/13/2021 #180 1rf     Patient Phone Number: [phone] (home)     Last appt: 12/4/2020   Next appt: Visit date not found    Last OARRS:   RX Monitoring 7/26/2017   Attestation The Prescription Monitoring Report for this patient was reviewed today. Periodic Controlled Substance Monitoring Possible medication side effects, risk of tolerance and/or dependence, and alternative treatments discussed; No signs of potential drug abuse or diversion identified.

## 2021-08-18 ENCOUNTER — OFFICE VISIT (OUTPATIENT)
Dept: FAMILY MEDICINE CLINIC | Age: 31
End: 2021-08-18

## 2021-08-18 ENCOUNTER — TELEPHONE (OUTPATIENT)
Dept: FAMILY MEDICINE CLINIC | Age: 31
End: 2021-08-18

## 2021-08-18 VITALS
HEIGHT: 67 IN | SYSTOLIC BLOOD PRESSURE: 127 MMHG | OXYGEN SATURATION: 98 % | DIASTOLIC BLOOD PRESSURE: 84 MMHG | WEIGHT: 293 LBS | HEART RATE: 80 BPM | BODY MASS INDEX: 45.99 KG/M2 | TEMPERATURE: 96.8 F

## 2021-08-18 DIAGNOSIS — J30.2 SEASONAL ALLERGIES: ICD-10-CM

## 2021-08-18 DIAGNOSIS — F90.0 ATTENTION DEFICIT HYPERACTIVITY DISORDER (ADHD), PREDOMINANTLY INATTENTIVE TYPE: Primary | ICD-10-CM

## 2021-08-18 DIAGNOSIS — J45.20 MILD INTERMITTENT ASTHMA WITHOUT COMPLICATION: ICD-10-CM

## 2021-08-18 PROCEDURE — 99213 OFFICE O/P EST LOW 20 MIN: CPT | Performed by: FAMILY MEDICINE

## 2021-08-18 RX ORDER — MONTELUKAST SODIUM 10 MG/1
TABLET ORAL
Qty: 90 TABLET | Refills: 3 | Status: SHIPPED | OUTPATIENT
Start: 2021-08-18 | End: 2022-08-26

## 2021-08-18 RX ORDER — DEXTROAMPHETAMINE SACCHARATE, AMPHETAMINE ASPARTATE MONOHYDRATE, DEXTROAMPHETAMINE SULFATE AND AMPHETAMINE SULFATE 3.75; 3.75; 3.75; 3.75 MG/1; MG/1; MG/1; MG/1
15 CAPSULE, EXTENDED RELEASE ORAL DAILY
Qty: 30 CAPSULE | Refills: 0 | Status: SHIPPED | OUTPATIENT
Start: 2021-08-18 | End: 2021-09-23 | Stop reason: DRUGHIGH

## 2021-08-18 SDOH — ECONOMIC STABILITY: FOOD INSECURITY: WITHIN THE PAST 12 MONTHS, YOU WORRIED THAT YOUR FOOD WOULD RUN OUT BEFORE YOU GOT MONEY TO BUY MORE.: SOMETIMES TRUE

## 2021-08-18 SDOH — ECONOMIC STABILITY: FOOD INSECURITY: WITHIN THE PAST 12 MONTHS, THE FOOD YOU BOUGHT JUST DIDN'T LAST AND YOU DIDN'T HAVE MONEY TO GET MORE.: SOMETIMES TRUE

## 2021-08-18 SDOH — ECONOMIC STABILITY: TRANSPORTATION INSECURITY
IN THE PAST 12 MONTHS, HAS LACK OF TRANSPORTATION KEPT YOU FROM MEETINGS, WORK, OR FROM GETTING THINGS NEEDED FOR DAILY LIVING?: NO

## 2021-08-18 SDOH — ECONOMIC STABILITY: TRANSPORTATION INSECURITY
IN THE PAST 12 MONTHS, HAS THE LACK OF TRANSPORTATION KEPT YOU FROM MEDICAL APPOINTMENTS OR FROM GETTING MEDICATIONS?: NO

## 2021-08-18 ASSESSMENT — SOCIAL DETERMINANTS OF HEALTH (SDOH): HOW HARD IS IT FOR YOU TO PAY FOR THE VERY BASICS LIKE FOOD, HOUSING, MEDICAL CARE, AND HEATING?: SOMEWHAT HARD

## 2021-08-18 NOTE — TELEPHONE ENCOUNTER
Patient  saw Jody at 9:30 today  Dr. Dani Dhillon thought Vyvanse would be a cheaper medication  Adderall is less expensive than the Vyvanse  Patient is asking for RX for Adderall sent to [facility] in [place]

## 2021-08-18 NOTE — PROGRESS NOTES
Cathy Zamudio is a 32 y.o. female. HPI:  Here for med check   Evaluated for ADD/evaluation reviewed, wishes to consider treatment, discussed Vyvanse, eleazar given  Well 100 mg of Zoloft  HAs better after wisdom teeth out     Weight noted, she would like to consider bariatric surgery in the future when she has insurance    Meds, vitamins and allergies reviewed with pt    Wt Readings from Last 3 Encounters:   08/18/21 (!) 349 lb (158.3 kg)   12/04/20 (!) 343 lb (155.6 kg)   11/16/20 (!) 343 lb (155.6 kg)       REVIEW OF SYSTEMS:   CONSTITUTIONAL: See history of present illness,   Weight noted   HEENT: No new vision difficulties or ringing in the ears. RESPIRATORY: No new SOB, PND, orthopnea or cough. CARDIOVASCULAR: no CP, palpitations or SOB with exertion  GI: No nausea, vomiting, diarrhea, constipation, abdominal pain or changes in bowel habits. : No urinary frequency, urgency, incontinence hematuria or dysuria. SKIN: No cyanosis or skin lesions. MUSCULOSKELETAL: No new muscle or joint pain. NEUROLOGICAL: No syncope or TIA-like symptoms. PSYCHIATRIC: No anxiety, insomnia or depression     Allergies   Allergen Reactions    Ceclor [Cefaclor]     Cephalosporins     Penicillins        Prior to Visit Medications    Medication Sig Taking? Authorizing Provider   montelukast (SINGULAIR) 10 MG tablet TAKE 1 TABLET BY MOUTH EVERY DAY Yes Margarette East MD   Lisdexamfetamine Dimesylate (VYVANSE) 30 MG capsule Take 1 capsule by mouth daily for 30 days.  Yes Margarette East MD   sertraline (ZOLOFT) 50 MG tablet TAKE 2 TABLETS BY MOUTH ONE TIME A DAY  Yes Margarette East MD   loratadine (CLARITIN) 10 MG tablet Take 1 tablet by mouth daily Yes Margarette East MD   rizatriptan (MAXALT) 10 MG tablet May repeat in 2 hours if needed Yes Margarette East MD   VENTOLIN  (90 Base) MCG/ACT inhaler INHALE 2 PUFFS INTO THE LUNGS EVERY 4 HOURS AS NEEDED FOR WHEEZING Yes Margarette East MD       Past Medical History: results, HM,  consultant and hospital notes ) and  obtaining present visit history, performing appropriate medical exam/evaluation, counseling and educating the patient (and family), ordering medications ,tests, and procedures as needed, refilling medication(s), placing referral(s) when needed in addition to coordinating care for this patient and documenting in electronic health record

## 2021-09-22 NOTE — PROGRESS NOTES
Evie Holloway is a 32 y.o. female. HPI:  Due to the current coronavirus pandemic, this telephone/video visit was insisted, with patient's consent, to reduce the patient's risk of exposure to COVID-19 and provide continuity of care for an established patient. The patient was at home while the provider was either at home or at the clinic. Services were provided through a synchronous discussion over the telephone and/or video chat to substitute for in person clinic visit, and coded as such. Vv for med check   Adderall is helping, she wishes to increase dose however  Blood pressure and heart rate seem to be stable at home  Due for GYN exam, I can do it in our office when she has insurance  Doing well with 100 mg of Zoloft also    Considering weight loss surgery when she has insurance, we have talked about this in the past    Meds, vitamins and allergies reviewed with pt    Wt Readings from Last 3 Encounters:   08/18/21 (!) 349 lb (158.3 kg)   12/04/20 (!) 343 lb (155.6 kg)   11/16/20 (!) 343 lb (155.6 kg)       REVIEW OF SYSTEMS:   CONSTITUTIONAL: See history of present illness,   Weight noted   HEENT: No new vision difficulties or ringing in the ears. RESPIRATORY: No new SOB, PND, orthopnea or cough. CARDIOVASCULAR: no CP, palpitations or SOB with exertion  GI: No nausea, vomiting, diarrhea, constipation, abdominal pain or changes in bowel habits. : No urinary frequency, urgency, incontinence hematuria or dysuria. SKIN: No cyanosis or skin lesions. MUSCULOSKELETAL: No new muscle or joint pain. NEUROLOGICAL: No syncope or TIA-like symptoms. PSYCHIATRIC: No anxiety, insomnia or depression     Allergies   Allergen Reactions    Ceclor [Cefaclor]     Cephalosporins     Penicillins        Prior to Visit Medications    Medication Sig Taking?  Authorizing Provider   montelukast (SINGULAIR) 10 MG tablet TAKE 1 TABLET BY MOUTH EVERY DAY Yes Luis Abarca MD   sertraline (ZOLOFT) 50 MG tablet TAKE 2 weeks after second Covid vaccine, has gotten her first COVID vaccine    20 Total Minutes spent pre charting (reviewing problem list, reviewing health maintenance items , meds, any test results, consultant and hospital notes ) and  obtaining present visit history, performing appropriate medical exam/evaluation, counseling and educating the patient (and family), ordering medications ,tests, and procedures as needed, refilling medication(s), placing referral(s) when needed in addition to coordinating care for this patient and documenting in electronic health record

## 2021-09-23 ENCOUNTER — VIRTUAL VISIT (OUTPATIENT)
Dept: FAMILY MEDICINE CLINIC | Age: 31
End: 2021-09-23

## 2021-09-23 DIAGNOSIS — F32.A ANXIETY AND DEPRESSION: Primary | ICD-10-CM

## 2021-09-23 DIAGNOSIS — F90.0 ATTENTION DEFICIT HYPERACTIVITY DISORDER (ADHD), PREDOMINANTLY INATTENTIVE TYPE: ICD-10-CM

## 2021-09-23 DIAGNOSIS — F41.9 ANXIETY AND DEPRESSION: Primary | ICD-10-CM

## 2021-09-23 PROCEDURE — 99213 OFFICE O/P EST LOW 20 MIN: CPT | Performed by: FAMILY MEDICINE

## 2021-09-23 RX ORDER — DEXTROAMPHETAMINE SACCHARATE, AMPHETAMINE ASPARTATE MONOHYDRATE, DEXTROAMPHETAMINE SULFATE AND AMPHETAMINE SULFATE 5; 5; 5; 5 MG/1; MG/1; MG/1; MG/1
20 CAPSULE, EXTENDED RELEASE ORAL EVERY MORNING
Qty: 30 CAPSULE | Refills: 0 | Status: SHIPPED | OUTPATIENT
Start: 2021-09-23 | End: 2021-10-28

## 2021-10-26 ENCOUNTER — TELEPHONE (OUTPATIENT)
Dept: FAMILY MEDICINE CLINIC | Age: 31
End: 2021-10-26

## 2021-10-26 ENCOUNTER — VIRTUAL VISIT (OUTPATIENT)
Dept: FAMILY MEDICINE CLINIC | Age: 31
End: 2021-10-26

## 2021-10-26 DIAGNOSIS — J06.9 PROTRACTED URI: Primary | ICD-10-CM

## 2021-10-26 DIAGNOSIS — J30.2 SEASONAL ALLERGIES: ICD-10-CM

## 2021-10-26 PROCEDURE — 99213 OFFICE O/P EST LOW 20 MIN: CPT | Performed by: FAMILY MEDICINE

## 2021-10-26 RX ORDER — FEXOFENADINE HCL 180 MG/1
180 TABLET ORAL DAILY
Qty: 30 TABLET | Refills: 5 | Status: SHIPPED | OUTPATIENT
Start: 2021-10-26 | End: 2022-06-16 | Stop reason: SDUPTHER

## 2021-10-26 RX ORDER — DOXYCYCLINE HYCLATE 100 MG
100 TABLET ORAL 2 TIMES DAILY
Qty: 14 TABLET | Refills: 0 | Status: SHIPPED | OUTPATIENT
Start: 2021-10-26 | End: 2021-11-02

## 2021-10-26 NOTE — TELEPHONE ENCOUNTER
Pt had a VV visit with Dr De Los Santos Hidden today for a sinus infection. Pt is asking for a RTW note excusing her from work 10/22 - 10/26. She is returning to work 10/27. Pt would like to  letter tomorrow morning. Call when ready.

## 2021-10-26 NOTE — LETTER
[address]  Phone: [phone]  Fax: [phone]    Austin Cortes MD        October 26, 2021     Patient: Mark Browne   YOB: 1990   Date of Visit: 10/26/2021       To Whom it May Concern:    Giovanni Lemus was seen in my clinic on 10/26/2021. Please Excuse her from work from 10/22/2021 thru 10/26/2021. She may return to work on 10/27/2021. If you have any questions or concerns, please don't hesitate to call.     Sincerely,         Austin Cortes MD
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
## 2021-10-28 DIAGNOSIS — F90.0 ATTENTION DEFICIT HYPERACTIVITY DISORDER (ADHD), PREDOMINANTLY INATTENTIVE TYPE: ICD-10-CM

## 2021-10-28 RX ORDER — DEXTROAMPHETAMINE SACCHARATE, AMPHETAMINE ASPARTATE MONOHYDRATE, DEXTROAMPHETAMINE SULFATE AND AMPHETAMINE SULFATE 5; 5; 5; 5 MG/1; MG/1; MG/1; MG/1
20 CAPSULE, EXTENDED RELEASE ORAL EVERY MORNING
Qty: 30 CAPSULE | Refills: 0 | Status: SHIPPED | OUTPATIENT
Start: 2021-10-28 | End: 2021-12-06 | Stop reason: SDUPTHER

## 2021-10-28 NOTE — TELEPHONE ENCOUNTER
Medication:   Requested Prescriptions     Pending Prescriptions Disp Refills    amphetamine-dextroamphetamine (ADDERALL XR) 20 MG extended release capsule [Pharmacy Med Name: Amphetamine-Dextroamphet ER Oral Capsule Extended Release 24 Hour 20 MG] 30 capsule 0     Sig: TAKE 1 CAPSULE BY MOUTH EVERY MORNING FOR 30 DAYS        Last Filled:  9/23/21    Patient Phone Number: [phone] (home)     Last appt: 10/26/2021   Next appt: Visit date not found    Last OARRS:   RX Monitoring 7/26/2017   Attestation The Prescription Monitoring Report for this patient was reviewed today. Periodic Controlled Substance Monitoring Possible medication side effects, risk of tolerance and/or dependence, and alternative treatments discussed; No signs of potential drug abuse or diversion identified.

## 2021-11-05 ENCOUNTER — APPOINTMENT (OUTPATIENT)
Dept: GENERAL RADIOLOGY | Age: 31
End: 2021-11-05

## 2021-11-05 ENCOUNTER — HOSPITAL ENCOUNTER (EMERGENCY)
Age: 31
Discharge: HOME | End: 2021-11-05
Attending: EMERGENCY MEDICINE

## 2021-11-05 VITALS
DIASTOLIC BLOOD PRESSURE: 88 MMHG | HEIGHT: 67 IN | TEMPERATURE: 98.2 F | HEART RATE: 102 BPM | RESPIRATION RATE: 18 BRPM | OXYGEN SATURATION: 95 % | BODY MASS INDEX: 45.99 KG/M2 | SYSTOLIC BLOOD PRESSURE: 155 MMHG | WEIGHT: 293 LBS

## 2021-11-05 DIAGNOSIS — S93.402A MODERATE LEFT ANKLE SPRAIN, INITIAL ENCOUNTER: Primary | ICD-10-CM

## 2021-11-05 DIAGNOSIS — S93.602A FOOT SPRAIN, LEFT, INITIAL ENCOUNTER: ICD-10-CM

## 2021-11-05 PROCEDURE — 73630 X-RAY EXAM OF FOOT: CPT

## 2021-11-05 PROCEDURE — 99283 EMERGENCY DEPT VISIT LOW MDM: CPT

## 2021-11-05 PROCEDURE — 73610 X-RAY EXAM OF ANKLE: CPT

## 2021-11-05 PROCEDURE — 6370000000 HC RX 637 (ALT 250 FOR IP): Performed by: EMERGENCY MEDICINE

## 2021-11-05 RX ORDER — IBUPROFEN 800 MG/1
800 TABLET ORAL EVERY 8 HOURS PRN
Qty: 20 TABLET | Refills: 0 | Status: SHIPPED | OUTPATIENT
Start: 2021-11-05 | End: 2022-05-04 | Stop reason: ALTCHOICE

## 2021-11-05 RX ORDER — HYDROCODONE BITARTRATE AND ACETAMINOPHEN 5; 325 MG/1; MG/1
1 TABLET ORAL ONCE
Status: COMPLETED | OUTPATIENT
Start: 2021-11-05 | End: 2021-11-05

## 2021-11-05 RX ORDER — TRAMADOL HYDROCHLORIDE 50 MG/1
50 TABLET ORAL EVERY 6 HOURS PRN
Qty: 10 TABLET | Refills: 0 | Status: SHIPPED | OUTPATIENT
Start: 2021-11-05 | End: 2021-11-08

## 2021-11-05 ASSESSMENT — PAIN SCALES - GENERAL
PAINLEVEL_OUTOF10: 7
PAINLEVEL_OUTOF10: 7

## 2021-11-05 ASSESSMENT — PAIN DESCRIPTION - ORIENTATION: ORIENTATION: LEFT

## 2021-11-05 ASSESSMENT — PAIN DESCRIPTION - FREQUENCY: FREQUENCY: CONTINUOUS

## 2021-11-05 ASSESSMENT — PAIN DESCRIPTION - LOCATION: LOCATION: ANKLE

## 2021-11-05 ASSESSMENT — PAIN DESCRIPTION - DESCRIPTORS: DESCRIPTORS: ACHING

## 2021-11-06 NOTE — ED NOTES
Pain better after pain medicine 4/10. Patient given d/c instructions with return verbalization including Rx x two. Emphasis on f/u, to return with worsening s/s. Shoe boot in place. Patient taken to car via [facility].      Abe Mendoza RN  11/05/21 [id number]
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
## 2021-12-06 ENCOUNTER — PATIENT MESSAGE (OUTPATIENT)
Dept: FAMILY MEDICINE CLINIC | Age: 31
End: 2021-12-06

## 2021-12-06 DIAGNOSIS — F90.0 ATTENTION DEFICIT HYPERACTIVITY DISORDER (ADHD), PREDOMINANTLY INATTENTIVE TYPE: ICD-10-CM

## 2021-12-06 RX ORDER — DEXTROAMPHETAMINE SACCHARATE, AMPHETAMINE ASPARTATE MONOHYDRATE, DEXTROAMPHETAMINE SULFATE AND AMPHETAMINE SULFATE 5; 5; 5; 5 MG/1; MG/1; MG/1; MG/1
20 CAPSULE, EXTENDED RELEASE ORAL EVERY MORNING
Qty: 30 CAPSULE | Refills: 0 | Status: CANCELLED | OUTPATIENT
Start: 2021-12-06 | End: 2022-01-05

## 2021-12-06 RX ORDER — DEXTROAMPHETAMINE SACCHARATE, AMPHETAMINE ASPARTATE MONOHYDRATE, DEXTROAMPHETAMINE SULFATE AND AMPHETAMINE SULFATE 5; 5; 5; 5 MG/1; MG/1; MG/1; MG/1
20 CAPSULE, EXTENDED RELEASE ORAL EVERY MORNING
Qty: 30 CAPSULE | Refills: 0 | Status: SHIPPED | OUTPATIENT
Start: 2021-12-06 | End: 2022-01-03 | Stop reason: DRUGHIGH

## 2021-12-06 NOTE — TELEPHONE ENCOUNTER
Medication and Quantity requested: amphetamine-dextroamphetamine (ADDERALL XR) 20 MG extended release capsule          Last Visit  10/26/21    Pharmacy and phone number updated in [facility]:  Yes    Colleen

## 2021-12-06 NOTE — TELEPHONE ENCOUNTER
From: Vikki Salcedo  To: Dr. Ellis [place]: 12/6/2021 11:52 AM EST  Subject: My ADHD medication    Hi, can you please send a refill for my ADHD medication? I called my pharmacy but they said I had to contact you directly to request it.

## 2022-01-03 DIAGNOSIS — F90.0 ATTENTION DEFICIT HYPERACTIVITY DISORDER (ADHD), PREDOMINANTLY INATTENTIVE TYPE: ICD-10-CM

## 2022-01-03 RX ORDER — DEXTROAMPHETAMINE SACCHARATE, AMPHETAMINE ASPARTATE MONOHYDRATE, DEXTROAMPHETAMINE SULFATE AND AMPHETAMINE SULFATE 7.5; 7.5; 7.5; 7.5 MG/1; MG/1; MG/1; MG/1
30 CAPSULE, EXTENDED RELEASE ORAL DAILY
Qty: 30 CAPSULE | Refills: 0 | Status: SHIPPED | OUTPATIENT
Start: 2022-01-03 | End: 2022-02-03 | Stop reason: SDUPTHER

## 2022-02-03 ENCOUNTER — PATIENT MESSAGE (OUTPATIENT)
Dept: FAMILY MEDICINE CLINIC | Age: 32
End: 2022-02-03

## 2022-02-03 DIAGNOSIS — F90.0 ATTENTION DEFICIT HYPERACTIVITY DISORDER (ADHD), PREDOMINANTLY INATTENTIVE TYPE: ICD-10-CM

## 2022-02-03 RX ORDER — DEXTROAMPHETAMINE SACCHARATE, AMPHETAMINE ASPARTATE MONOHYDRATE, DEXTROAMPHETAMINE SULFATE AND AMPHETAMINE SULFATE 7.5; 7.5; 7.5; 7.5 MG/1; MG/1; MG/1; MG/1
30 CAPSULE, EXTENDED RELEASE ORAL DAILY
Qty: 30 CAPSULE | Refills: 0 | Status: SHIPPED | OUTPATIENT
Start: 2022-02-03 | End: 2022-03-09 | Stop reason: SDUPTHER

## 2022-02-03 NOTE — TELEPHONE ENCOUNTER
Medication:   Requested Prescriptions     Pending Prescriptions Disp Refills    amphetamine-dextroamphetamine (ADDERALL XR) 30 MG extended release capsule 30 capsule 0     Sig: Take 1 capsule by mouth daily for 30 days. Last Filled:  30 x 0 RF 1/3/22    Patient Phone Number: [phone] (home)     Last appt: 10/26/2021   Next appt: Visit date not found    Last OARRS:   RX Monitoring 12/6/2021   Attestation -   Periodic Controlled Substance Monitoring No signs of potential drug abuse or diversion identified.

## 2022-02-07 ENCOUNTER — TELEPHONE (OUTPATIENT)
Dept: FAMILY MEDICINE CLINIC | Age: 32
End: 2022-02-07

## 2022-02-07 NOTE — TELEPHONE ENCOUNTER
Submitted PA for Amphetamine-Dextroamphet ER 30MG er capsules  [facility]ia [facility] Key: [id number] STATUS: APPROVED. Medication has been approved through 02/07/2023. If this requires a response please respond to the pool. [address])    Please advise patient. Thank you.

## 2022-02-07 NOTE — TELEPHONE ENCOUNTER
Office has been notified that pt is requiring Prior Authorization for the following medication:  -- Amphetamine-Dex    Please initiate this request through CoverMyMeds, contacting the following Payor/Insurance:  -- no insurance    Please see below, or the documentation attached to this encounter for any additional information that may assist in processing PA:  -- F90.0    Thank you! No

## 2022-02-23 ENCOUNTER — E-VISIT (OUTPATIENT)
Dept: FAMILY MEDICINE CLINIC | Age: 32
End: 2022-02-23
Payer: COMMERCIAL

## 2022-02-23 DIAGNOSIS — J06.9 VIRAL URI WITH COUGH: Primary | ICD-10-CM

## 2022-02-23 PROCEDURE — 99422 OL DIG E/M SVC 11-20 MIN: CPT | Performed by: FAMILY MEDICINE

## 2022-02-23 ASSESSMENT — LIFESTYLE VARIABLES
SMOKING_STATUS: NO, I'M A FORMER SMOKER
SMOKING_YEARS: 2
PACKS_PER_DAY: .5

## 2022-02-24 RX ORDER — FLUTICASONE PROPIONATE 50 MCG
2 SPRAY, SUSPENSION (ML) NASAL DAILY
Qty: 16 G | Refills: 1 | Status: SHIPPED | OUTPATIENT
Start: 2022-02-24 | End: 2022-05-04 | Stop reason: ALTCHOICE

## 2022-02-24 NOTE — PROGRESS NOTES
Consuelo Herrera with cough x 1 day   Hx allergies     mychart message = .reviewed   Unable to reach pt by phone     Add on floanse   Add on advair inhaler     Follow up if sxs persist or worsen and for routine care with PCP

## 2022-02-28 ENCOUNTER — PATIENT MESSAGE (OUTPATIENT)
Dept: FAMILY MEDICINE CLINIC | Age: 32
End: 2022-02-28

## 2022-02-28 NOTE — TELEPHONE ENCOUNTER
Ok to write note for this Pt for time requested   Stamp my name or have Dr. Cely Keita sign for me   Thank you   Dr. Yosef Riddle

## 2022-02-28 NOTE — LETTER
[address]  Phone: [phone]  Fax: [phone]    Jovita Pitt MD        February 28, 2022     Patient: Robert Foster   YOB: 1990   Date of Visit: 02/23/2022       To Whom it May Concern:    Veronica Palm was seen in my clinic on 02/23/2022. Please excuse patient from work on 02/22/2022 through 02/27/2022 She may return to work on 02/28/2022. If you have any questions or concerns, please don't hesitate to call.     Sincerely,         Jovita Pitt MD

## 2022-03-07 ENCOUNTER — E-VISIT (OUTPATIENT)
Dept: FAMILY MEDICINE CLINIC | Age: 32
End: 2022-03-07
Payer: COMMERCIAL

## 2022-03-07 DIAGNOSIS — J06.9 PROTRACTED URI: Primary | ICD-10-CM

## 2022-03-07 PROCEDURE — 99422 OL DIG E/M SVC 11-20 MIN: CPT | Performed by: FAMILY MEDICINE

## 2022-03-07 RX ORDER — CLINDAMYCIN HYDROCHLORIDE 300 MG/1
300 CAPSULE ORAL 2 TIMES DAILY
Qty: 20 CAPSULE | Refills: 0 | Status: SHIPPED | OUTPATIENT
Start: 2022-03-07 | End: 2022-03-17

## 2022-03-07 ASSESSMENT — LIFESTYLE VARIABLES
PACKS_PER_DAY: .5
SMOKING_YEARS: 2
SMOKING_STATUS: NO, I'M A FORMER SMOKER

## 2022-03-07 NOTE — PROGRESS NOTES
My chart message reviewed in detail  URI protracted  Clindamycin twice daily for 7 to 10 days  Follow up if sxs persist or worsen and for routine care with PCP    Allergies noted

## 2022-03-09 ENCOUNTER — PATIENT MESSAGE (OUTPATIENT)
Dept: FAMILY MEDICINE CLINIC | Age: 32
End: 2022-03-09

## 2022-03-09 DIAGNOSIS — F41.9 ANXIETY AND DEPRESSION: ICD-10-CM

## 2022-03-09 DIAGNOSIS — F32.A ANXIETY AND DEPRESSION: ICD-10-CM

## 2022-03-09 DIAGNOSIS — F90.0 ATTENTION DEFICIT HYPERACTIVITY DISORDER (ADHD), PREDOMINANTLY INATTENTIVE TYPE: ICD-10-CM

## 2022-03-09 RX ORDER — DEXTROAMPHETAMINE SACCHARATE, AMPHETAMINE ASPARTATE MONOHYDRATE, DEXTROAMPHETAMINE SULFATE AND AMPHETAMINE SULFATE 7.5; 7.5; 7.5; 7.5 MG/1; MG/1; MG/1; MG/1
30 CAPSULE, EXTENDED RELEASE ORAL DAILY
Qty: 30 CAPSULE | Refills: 0 | Status: SHIPPED | OUTPATIENT
Start: 2022-03-09 | End: 2022-04-11 | Stop reason: SDUPTHER

## 2022-03-09 NOTE — TELEPHONE ENCOUNTER
From: Keya Elias  To: Dr. Almendarez [place]: 3/9/2022 8:29 AM EST  Subject: Refill    Hi, can you please send a refill of my adderral to the [address]? Also could you send me information for weight loss surgery through [facility]?  Im interested in going to [facility] for it

## 2022-03-09 NOTE — TELEPHONE ENCOUNTER
Medication:   Requested Prescriptions     Pending Prescriptions Disp Refills    amphetamine-dextroamphetamine (ADDERALL XR) 30 MG extended release capsule 30 capsule 0     Sig: Take 1 capsule by mouth daily for 30 days. Last Filled:  02/03/2022 #30 0rf    Patient Phone Number: [phone] (home)     Last appt: 3/7/2022 E-visit  Next appt: Visit date not found    Last OARRS:   RX Monitoring 2/3/2022   Attestation -   Periodic Controlled Substance Monitoring No signs of potential drug abuse or diversion identified.

## 2022-03-09 NOTE — TELEPHONE ENCOUNTER
Medication:   Requested Prescriptions     Pending Prescriptions Disp Refills    sertraline (ZOLOFT) 50 MG tablet [Pharmacy Med Name: Sertraline HCl Oral Tablet 50 MG] 180 tablet 0     Sig: TAKE 2 TABLETS BY MOUTH EVERY DAY        Last Filled:  08/16/2021 #180 1rf     Patient Phone Number: [phone] (home)     Last appt: 3/7/2022   Next appt: Visit date not found    Last OARRS:   RX Monitoring 2/3/2022   Attestation -   Periodic Controlled Substance Monitoring No signs of potential drug abuse or diversion identified.

## 2022-04-11 ENCOUNTER — PATIENT MESSAGE (OUTPATIENT)
Dept: FAMILY MEDICINE CLINIC | Age: 32
End: 2022-04-11

## 2022-04-11 DIAGNOSIS — F90.0 ATTENTION DEFICIT HYPERACTIVITY DISORDER (ADHD), PREDOMINANTLY INATTENTIVE TYPE: ICD-10-CM

## 2022-04-11 RX ORDER — DEXTROAMPHETAMINE SACCHARATE, AMPHETAMINE ASPARTATE MONOHYDRATE, DEXTROAMPHETAMINE SULFATE AND AMPHETAMINE SULFATE 7.5; 7.5; 7.5; 7.5 MG/1; MG/1; MG/1; MG/1
30 CAPSULE, EXTENDED RELEASE ORAL DAILY
Qty: 30 CAPSULE | Refills: 0 | Status: SHIPPED | OUTPATIENT
Start: 2022-04-11 | End: 2022-05-11 | Stop reason: DRUGHIGH

## 2022-04-11 NOTE — TELEPHONE ENCOUNTER
Called and spoke with patient, patient states that she will keep the medication the same for this month and will call back to schedule an appointment with Dr. Gt Young. Also, patient will talk to Dr. Gt Young about weight lose surgery at her appointment.   Medication is teed up for same amount please look over and sign  Last fill date: 03/09/2022  Last OV:10/26/2021 VV

## 2022-04-11 NOTE — TELEPHONE ENCOUNTER
From: Argelia Alan  To: Dr. Landaverde Cover: 4/11/2022 8:23 AM EDT  Subject: ADHD    Hi, can you please send a refill for my Adderall to the [address] [place] [address].?  Also could we possibly increase my dose because it feels like its not as effective as it was

## 2022-04-11 NOTE — TELEPHONE ENCOUNTER
Office visit with Dr. Carlin Liu on Wednesday to adjust Adderall dose/this is a controlled substance so she needs an in office visit

## 2022-05-03 ENCOUNTER — TELEPHONE (OUTPATIENT)
Dept: BARIATRICS/WEIGHT MGMT | Age: 32
End: 2022-05-03

## 2022-05-04 ENCOUNTER — OFFICE VISIT (OUTPATIENT)
Dept: BARIATRICS/WEIGHT MGMT | Age: 32
End: 2022-05-04
Payer: COMMERCIAL

## 2022-05-04 VITALS
HEIGHT: 67 IN | HEART RATE: 102 BPM | SYSTOLIC BLOOD PRESSURE: 139 MMHG | BODY MASS INDEX: 45.99 KG/M2 | WEIGHT: 293 LBS | DIASTOLIC BLOOD PRESSURE: 100 MMHG

## 2022-05-04 DIAGNOSIS — E66.01 MORBID OBESITY WITH BMI OF 50.0-59.9, ADULT (HCC): Primary | ICD-10-CM

## 2022-05-04 PROCEDURE — 99204 OFFICE O/P NEW MOD 45 MIN: CPT | Performed by: SURGERY
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
## 2022-05-11 ENCOUNTER — TELEPHONE (OUTPATIENT)
Dept: FAMILY MEDICINE CLINIC | Age: 32
End: 2022-05-11

## 2022-05-11 ENCOUNTER — OFFICE VISIT (OUTPATIENT)
Dept: FAMILY MEDICINE CLINIC | Age: 32
End: 2022-05-11
Payer: COMMERCIAL

## 2022-05-11 VITALS
BODY MASS INDEX: 50.75 KG/M2 | HEART RATE: 73 BPM | OXYGEN SATURATION: 98 % | SYSTOLIC BLOOD PRESSURE: 126 MMHG | DIASTOLIC BLOOD PRESSURE: 78 MMHG | WEIGHT: 293 LBS

## 2022-05-11 DIAGNOSIS — E66.01 MORBID OBESITY WITH BMI OF 50.0-59.9, ADULT (HCC): Primary | ICD-10-CM

## 2022-05-11 DIAGNOSIS — H65.21 RIGHT CHRONIC SEROUS OTITIS MEDIA: ICD-10-CM

## 2022-05-11 DIAGNOSIS — E66.01 MORBID OBESITY WITH BMI OF 50.0-59.9, ADULT (HCC): ICD-10-CM

## 2022-05-11 DIAGNOSIS — Z11.59 NEED FOR HEPATITIS C SCREENING TEST: ICD-10-CM

## 2022-05-11 DIAGNOSIS — F90.0 ATTENTION DEFICIT HYPERACTIVITY DISORDER (ADHD), PREDOMINANTLY INATTENTIVE TYPE: ICD-10-CM

## 2022-05-11 LAB
A/G RATIO: 1.9 (ref 1.1–2.2)
ALBUMIN SERPL-MCNC: 4.6 G/DL (ref 3.4–5)
ALP BLD-CCNC: 69 U/L (ref 40–129)
ALT SERPL-CCNC: 24 U/L (ref 10–40)
ANION GAP SERPL CALCULATED.3IONS-SCNC: 14 MMOL/L (ref 3–16)
AST SERPL-CCNC: 18 U/L (ref 15–37)
BASOPHILS ABSOLUTE: 0.1 K/UL (ref 0–0.2)
BASOPHILS RELATIVE PERCENT: 1 %
BILIRUB SERPL-MCNC: 0.4 MG/DL (ref 0–1)
BUN BLDV-MCNC: 13 MG/DL (ref 7–20)
CALCIUM SERPL-MCNC: 9.1 MG/DL (ref 8.3–10.6)
CHLORIDE BLD-SCNC: 104 MMOL/L (ref 99–110)
CHOLESTEROL, TOTAL: 190 MG/DL (ref 0–199)
CO2: 22 MMOL/L (ref 21–32)
CREAT SERPL-MCNC: 0.7 MG/DL (ref 0.6–1.1)
EOSINOPHILS ABSOLUTE: 0.2 K/UL (ref 0–0.6)
EOSINOPHILS RELATIVE PERCENT: 2.6 %
FOLATE: 14.01 NG/ML (ref 4.78–24.2)
GFR AFRICAN AMERICAN: >60
GFR NON-AFRICAN AMERICAN: >60
GLUCOSE BLD-MCNC: 104 MG/DL (ref 70–99)
HCT VFR BLD CALC: 41.2 % (ref 36–48)
HDLC SERPL-MCNC: 31 MG/DL (ref 40–60)
HEMOGLOBIN: 14 G/DL (ref 12–16)
IRON SATURATION: 29 % (ref 15–50)
IRON: 83 UG/DL (ref 37–145)
LDL CHOLESTEROL CALCULATED: 106 MG/DL
LYMPHOCYTES ABSOLUTE: 1.6 K/UL (ref 1–5.1)
LYMPHOCYTES RELATIVE PERCENT: 25.4 %
MCH RBC QN AUTO: 27.3 PG (ref 26–34)
MCHC RBC AUTO-ENTMCNC: 34.1 G/DL (ref 31–36)
MCV RBC AUTO: 80 FL (ref 80–100)
MONOCYTES ABSOLUTE: 0.5 K/UL (ref 0–1.3)
MONOCYTES RELATIVE PERCENT: 7.3 %
NEUTROPHILS ABSOLUTE: 4 K/UL (ref 1.7–7.7)
NEUTROPHILS RELATIVE PERCENT: 63.7 %
PDW BLD-RTO: 13.4 % (ref 12.4–15.4)
PLATELET # BLD: 198 K/UL (ref 135–450)
PMV BLD AUTO: 8.5 FL (ref 5–10.5)
POTASSIUM SERPL-SCNC: 4.4 MMOL/L (ref 3.5–5.1)
RBC # BLD: 5.15 M/UL (ref 4–5.2)
SODIUM BLD-SCNC: 140 MMOL/L (ref 136–145)
TOTAL IRON BINDING CAPACITY: 286 UG/DL (ref 260–445)
TOTAL PROTEIN: 7 G/DL (ref 6.4–8.2)
TRIGL SERPL-MCNC: 263 MG/DL (ref 0–150)
TSH REFLEX: 1.44 UIU/ML (ref 0.27–4.2)
VITAMIN B-12: 516 PG/ML (ref 211–911)
VITAMIN D 25-HYDROXY: 18.5 NG/ML
VLDLC SERPL CALC-MCNC: 53 MG/DL
WBC # BLD: 6.3 K/UL (ref 4–11)

## 2022-05-11 PROCEDURE — 99214 OFFICE O/P EST MOD 30 MIN: CPT | Performed by: FAMILY MEDICINE

## 2022-05-11 RX ORDER — NEOMYCIN SULFATE, POLYMYXIN B SULFATE AND HYDROCORTISONE 10; 3.5; 1 MG/ML; MG/ML; [USP'U]/ML
3 SUSPENSION/ DROPS AURICULAR (OTIC) 3 TIMES DAILY
Qty: 10 ML | Refills: 0 | Status: SHIPPED | OUTPATIENT
Start: 2022-05-11 | End: 2022-05-21

## 2022-05-11 RX ORDER — DEXTROAMPHETAMINE SACCHARATE, AMPHETAMINE ASPARTATE, DEXTROAMPHETAMINE SULFATE AND AMPHETAMINE SULFATE 3.75; 3.75; 3.75; 3.75 MG/1; MG/1; MG/1; MG/1
15 TABLET ORAL 2 TIMES DAILY
Qty: 60 TABLET | Refills: 0 | Status: SHIPPED | OUTPATIENT
Start: 2022-05-11 | End: 2022-06-22 | Stop reason: SDUPTHER

## 2022-05-11 ASSESSMENT — PATIENT HEALTH QUESTIONNAIRE - PHQ9
2. FEELING DOWN, DEPRESSED OR HOPELESS: 0
SUM OF ALL RESPONSES TO PHQ QUESTIONS 1-9: 2
9. THOUGHTS THAT YOU WOULD BE BETTER OFF DEAD, OR OF HURTING YOURSELF: 0
8. MOVING OR SPEAKING SO SLOWLY THAT OTHER PEOPLE COULD HAVE NOTICED. OR THE OPPOSITE, BEING SO FIGETY OR RESTLESS THAT YOU HAVE BEEN MOVING AROUND A LOT MORE THAN USUAL: 1
7. TROUBLE CONCENTRATING ON THINGS, SUCH AS READING THE NEWSPAPER OR WATCHING TELEVISION: 1
SUM OF ALL RESPONSES TO PHQ9 QUESTIONS 1 & 2: 0
SUM OF ALL RESPONSES TO PHQ QUESTIONS 1-9: 2
4. FEELING TIRED OR HAVING LITTLE ENERGY: 0
5. POOR APPETITE OR OVEREATING: 0
3. TROUBLE FALLING OR STAYING ASLEEP: 0
6. FEELING BAD ABOUT YOURSELF - OR THAT YOU ARE A FAILURE OR HAVE LET YOURSELF OR YOUR FAMILY DOWN: 0
10. IF YOU CHECKED OFF ANY PROBLEMS, HOW DIFFICULT HAVE THESE PROBLEMS MADE IT FOR YOU TO DO YOUR WORK, TAKE CARE OF THINGS AT HOME, OR GET ALONG WITH OTHER PEOPLE: 1
1. LITTLE INTEREST OR PLEASURE IN DOING THINGS: 0
SUM OF ALL RESPONSES TO PHQ QUESTIONS 1-9: 2
SUM OF ALL RESPONSES TO PHQ QUESTIONS 1-9: 2

## 2022-05-11 NOTE — TELEPHONE ENCOUNTER
Office has been notified that pt is requiring Prior Authorization for the following medication:  --amphetamine-dextroamphetamine (ADDERALL, 15MG,) 15 MG tablet          Please initiate this request through CoverMyMeds, contacting the following Payor/Insurance:  -- BCBS    Please see below, or the documentation attached to this encounter for any additional information that may assist in processing PA:  -- [id number].    Thank you!

## 2022-05-11 NOTE — TELEPHONE ENCOUNTER
Submitted PA for Amphetamine-Dextroamphetamine 15MG tablets  Via [facility] Key: [id number] STATUS: APPROVED. LETTER ATTACHED. If this requires a response please respond to the pool. [address]). Please advise patient thank you.

## 2022-05-12 LAB
ESTIMATED AVERAGE GLUCOSE: 102.5 MG/DL
HBA1C MFR BLD: 5.2 %

## 2022-05-17 ENCOUNTER — PATIENT MESSAGE (OUTPATIENT)
Dept: BARIATRICS/WEIGHT MGMT | Age: 32
End: 2022-05-17

## 2022-05-17 NOTE — TELEPHONE ENCOUNTER
From: Lucas Nguyen  To: Dr. Dale Schlatter: 5/17/2022 2:34 PM EDT  Subject: Surgery Type    Hi,    I just wanted to let you know after some more research on my end I have decided I'm not interested in gastric sleeve but would like to have gastric bypass done. I know how severe the risks can be versus sleeve but gastric bypass is the surgery I want. I just wanted to send it in an email like this because during our first meeting I felt a little bit like I was talked into sleeve even though I don't really want that procedure. I also have a hard time advocating for myself versus going with the flow so I thought notifying you this way would be better. I'm sticking to the guidelines the nutritionist gave me and getting active! Already done my bloodwork and requested the letter of medical necessity from my pcp and have my psych evaluation on 05/23! Look forward to seeing you in our next appointment!
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
## 2022-06-01 ENCOUNTER — OFFICE VISIT (OUTPATIENT)
Dept: BARIATRICS/WEIGHT MGMT | Age: 32
End: 2022-06-01

## 2022-06-01 VITALS — HEIGHT: 67 IN | WEIGHT: 293 LBS | BODY MASS INDEX: 45.99 KG/M2

## 2022-06-01 DIAGNOSIS — E66.01 MORBID OBESITY WITH BMI OF 50.0-59.9, ADULT (HCC): Primary | ICD-10-CM

## 2022-06-01 DIAGNOSIS — K21.9 GASTROESOPHAGEAL REFLUX DISEASE, UNSPECIFIED WHETHER ESOPHAGITIS PRESENT: ICD-10-CM

## 2022-06-01 PROCEDURE — 99214 OFFICE O/P EST MOD 30 MIN: CPT | Performed by: SURGERY
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
## 2022-06-16 RX ORDER — FEXOFENADINE HCL 180 MG/1
180 TABLET ORAL DAILY
Qty: 30 TABLET | Refills: 5 | Status: SHIPPED | OUTPATIENT
Start: 2022-06-16

## 2022-06-16 NOTE — TELEPHONE ENCOUNTER
Medication:   Requested Prescriptions     Pending Prescriptions Disp Refills    fexofenadine (ALLEGRA) 180 MG tablet 30 tablet 5     Sig: Take 1 tablet by mouth daily        Last Filled:      Patient Phone Number: [phone] (home)     Last appt: 5/11/2022   Next appt: Visit date not found    Last OARRS:   RX Monitoring 2/3/2022   Attestation -   Periodic Controlled Substance Monitoring No signs of potential drug abuse or diversion identified.

## 2022-06-22 ENCOUNTER — PATIENT MESSAGE (OUTPATIENT)
Dept: FAMILY MEDICINE CLINIC | Age: 32
End: 2022-06-22

## 2022-06-22 DIAGNOSIS — F90.0 ATTENTION DEFICIT HYPERACTIVITY DISORDER (ADHD), PREDOMINANTLY INATTENTIVE TYPE: ICD-10-CM

## 2022-06-22 RX ORDER — DEXTROAMPHETAMINE SACCHARATE, AMPHETAMINE ASPARTATE, DEXTROAMPHETAMINE SULFATE AND AMPHETAMINE SULFATE 3.75; 3.75; 3.75; 3.75 MG/1; MG/1; MG/1; MG/1
15 TABLET ORAL 2 TIMES DAILY
Qty: 60 TABLET | Refills: 0 | Status: SHIPPED | OUTPATIENT
Start: 2022-06-22 | End: 2022-08-04 | Stop reason: SDUPTHER

## 2022-06-22 NOTE — TELEPHONE ENCOUNTER
Medication:   Requested Prescriptions     Pending Prescriptions Disp Refills    amphetamine-dextroamphetamine (ADDERALL, 15MG,) 15 MG tablet 60 tablet 0     Sig: Take 1 tablet by mouth 2 times daily for 30 days. Last Filled:  05/11/2022    Patient Phone Number: [phone] (home)     Last appt: 5/11/2022   Next appt: Visit date not found    Last OARRS:   RX Monitoring 2/3/2022   Attestation -   Periodic Controlled Substance Monitoring No signs of potential drug abuse or diversion identified.

## 2022-06-22 NOTE — TELEPHONE ENCOUNTER
From: Bryce Hameed  To: Dr. Emily Scales: 2022 9:43 AM EDT  Subject: ADHD medication    Hi, can I please have a refill sent for my ADHD medication?

## 2022-07-06 ENCOUNTER — OFFICE VISIT (OUTPATIENT)
Dept: BARIATRICS/WEIGHT MGMT | Age: 32
End: 2022-07-06
Payer: COMMERCIAL

## 2022-07-06 VITALS — WEIGHT: 293 LBS | HEIGHT: 67 IN | BODY MASS INDEX: 45.99 KG/M2

## 2022-07-06 DIAGNOSIS — E66.01 MORBID OBESITY WITH BMI OF 50.0-59.9, ADULT (HCC): Primary | ICD-10-CM

## 2022-07-06 DIAGNOSIS — K21.9 GASTROESOPHAGEAL REFLUX DISEASE, UNSPECIFIED WHETHER ESOPHAGITIS PRESENT: ICD-10-CM

## 2022-07-06 PROCEDURE — 99214 OFFICE O/P EST MOD 30 MIN: CPT | Performed by: SURGERY

## 2022-07-06 NOTE — PROGRESS NOTES
Jeet Gerber lost 6 lbs over past ~month. Is pt eating at least 4 times everyday? yes    B- yogurt & protein granola w/ strawberries OR scrambled eggs w/ tomatoes/spinach  S- small apple & PB  L- turkey burger OR healthy choice steamer meal OR humus & veggies OR CC & veggies OR egg wraps w/ lunch meat  S- humus OR CC & veggies   D- shrimp skewers OR salmon OR turkey tacos w/ veggies (zucchini or broccoli)  S- none    Is pt eating a lean protein source with all meals and snacks? yes    Has pt decreased their portions using the plate method? yes - weighing food    Is pt choosing low fat/sugar free options? yes    Is pt drinking at least 64 oz of clear liquids everyday? yes - water (working on 30-30-30 rule)    Has pt stopped drinking carbonation, caffeinated, and sugar sweetened beverages? yes    Has pt sampled Unjury and/or Nectar protein? yes - tried & tolerated    Has pt attended a support group?  Completed    Participating in intentional exercise? yes - walks OR hikes 2-3x/wk for at least 1 hour, goes to [facility] a lot ([place]s for [id number] steps)    Plan/Recommendations:   - Continue plan    Handouts: none    Ganga Perez, JD, LD
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
## 2022-07-25 PROBLEM — E78.2 MIXED HYPERLIPIDEMIA: Status: ACTIVE | Noted: 2022-07-25

## 2022-08-01 ENCOUNTER — OFFICE VISIT (OUTPATIENT)
Dept: BARIATRICS/WEIGHT MGMT | Age: 32
End: 2022-08-01
Payer: COMMERCIAL

## 2022-08-01 VITALS — HEIGHT: 67 IN | WEIGHT: 293 LBS | BODY MASS INDEX: 45.99 KG/M2

## 2022-08-01 DIAGNOSIS — K21.9 GASTROESOPHAGEAL REFLUX DISEASE, UNSPECIFIED WHETHER ESOPHAGITIS PRESENT: ICD-10-CM

## 2022-08-01 DIAGNOSIS — E78.2 MIXED HYPERLIPIDEMIA: ICD-10-CM

## 2022-08-01 DIAGNOSIS — E66.01 MORBID OBESITY WITH BMI OF 50.0-59.9, ADULT (HCC): Primary | ICD-10-CM

## 2022-08-01 PROCEDURE — 99214 OFFICE O/P EST MOD 30 MIN: CPT | Performed by: SURGERY

## 2022-08-01 NOTE — PATIENT INSTRUCTIONS
Patient received dietary handouts and education.     Plan/Recommendations:  - Continue tracking and current eating patterns including protein and plants with all meals and snacks  - Increase activity as tolerated
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
## 2022-08-03 NOTE — PROGRESS NOTES
Patient Name:   Hudson Garcia        Type of Surgery:  Sleeve         Date of Surgery:  9/6/22        Start Pre-Op Diet On:  8/24/22        Start Clear Liquids On:  9/5/22          NPO after midnight the night before your surgery! Take morning medications with a small amount of water.     NOTES:_______________________________________  ______________________________________________

## 2022-08-04 ENCOUNTER — PATIENT MESSAGE (OUTPATIENT)
Dept: FAMILY MEDICINE CLINIC | Age: 32
End: 2022-08-04

## 2022-08-04 DIAGNOSIS — F90.0 ATTENTION DEFICIT HYPERACTIVITY DISORDER (ADHD), PREDOMINANTLY INATTENTIVE TYPE: ICD-10-CM

## 2022-08-04 RX ORDER — DEXTROAMPHETAMINE SACCHARATE, AMPHETAMINE ASPARTATE, DEXTROAMPHETAMINE SULFATE AND AMPHETAMINE SULFATE 3.75; 3.75; 3.75; 3.75 MG/1; MG/1; MG/1; MG/1
15 TABLET ORAL 2 TIMES DAILY
Qty: 60 TABLET | Refills: 0 | Status: SHIPPED | OUTPATIENT
Start: 2022-08-04 | End: 2022-09-16 | Stop reason: SDUPTHER

## 2022-08-04 NOTE — TELEPHONE ENCOUNTER
Medication:   Requested Prescriptions     Pending Prescriptions Disp Refills    amphetamine-dextroamphetamine (ADDERALL, 15MG,) 15 MG tablet 60 tablet 0     Sig: Take 1 tablet by mouth in the morning and 1 tablet before bedtime. Do all this for 30 days. Last Filled:  06/22/2022    Patient Phone Number: [phone] (home)     Last appt: 5/11/2022   Next appt: 8/16/2022    Last OARRS:   RX Monitoring 6/22/2022   Attestation -   Periodic Controlled Substance Monitoring No signs of potential drug abuse or diversion identified.

## 2022-08-09 ENCOUNTER — OFFICE VISIT (OUTPATIENT)
Dept: BARIATRICS/WEIGHT MGMT | Age: 32
End: 2022-08-09
Payer: COMMERCIAL

## 2022-08-09 VITALS — WEIGHT: 293 LBS | BODY MASS INDEX: 45.99 KG/M2 | HEIGHT: 67 IN

## 2022-08-09 DIAGNOSIS — E66.01 MORBID OBESITY WITH BMI OF 45.0-49.9, ADULT (HCC): Primary | ICD-10-CM

## 2022-08-09 DIAGNOSIS — E78.2 MIXED HYPERLIPIDEMIA: ICD-10-CM

## 2022-08-09 PROCEDURE — 99214 OFFICE O/P EST MOD 30 MIN: CPT | Performed by: NURSE PRACTITIONER

## 2022-08-09 ASSESSMENT — ENCOUNTER SYMPTOMS
EYES NEGATIVE: 1
GASTROINTESTINAL NEGATIVE: 1
ALLERGIC/IMMUNOLOGIC NEGATIVE: 1
COUGH: 0
RESPIRATORY NEGATIVE: 1
SHORTNESS OF BREATH: 0
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
## 2022-08-18 ENCOUNTER — TELEPHONE (OUTPATIENT)
Dept: BARIATRICS/WEIGHT MGMT | Age: 32
End: 2022-08-18

## 2022-08-18 NOTE — TELEPHONE ENCOUNTER
Marybel silver auth submitted sleeve      Transaction ID: [id number]   Customer ID: [id number]   Transaction Date: 2022-08-18      Service Type  2 - Surgical    Place of Service  [address]    Admission - Discharge Date  2022-09-06 - 2022-09-07    Admission Type  Elective    Quantity  1 Days  Diagnosis Code 1   - Morbid (severe) obesity due to excess calories    Diagnosis Code 2  E782 - Mixed hyperlipidemia    Level of Care 1  0160 - Room & Board-Other-General    Procedure Code 1 (CPT/HCPCS)  05934    Procedure Service Quantity  1 Units    Rendering Provider/Facility     Provider 1  Name  Sheree Peña  [id number]    Specialty  [id number]  Provider Role  Service Provider    Address  [address] [place] [address], [address] [facility], [facility], [address]    Provider 2  Name  [facility]  [id number]    Provider Role  Facility    Address  [place] [address], [facility], [address]
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
## 2022-08-22 ENCOUNTER — OFFICE VISIT (OUTPATIENT)
Dept: FAMILY MEDICINE CLINIC | Age: 32
End: 2022-08-22
Payer: COMMERCIAL

## 2022-08-22 VITALS
BODY MASS INDEX: 45.99 KG/M2 | HEART RATE: 81 BPM | RESPIRATION RATE: 16 BRPM | OXYGEN SATURATION: 98 % | DIASTOLIC BLOOD PRESSURE: 84 MMHG | SYSTOLIC BLOOD PRESSURE: 122 MMHG | HEIGHT: 67 IN | WEIGHT: 293 LBS

## 2022-08-22 DIAGNOSIS — E66.01 MORBID OBESITY WITH BMI OF 45.0-49.9, ADULT (HCC): ICD-10-CM

## 2022-08-22 DIAGNOSIS — Z01.818 PRE-OP EXAM: Primary | ICD-10-CM

## 2022-08-22 DIAGNOSIS — Z23 NEED FOR PNEUMOCOCCAL VACCINATION: ICD-10-CM

## 2022-08-22 DIAGNOSIS — F41.9 ANXIETY AND DEPRESSION: ICD-10-CM

## 2022-08-22 DIAGNOSIS — Z11.59 NEED FOR HEPATITIS C SCREENING TEST: ICD-10-CM

## 2022-08-22 DIAGNOSIS — Z01.818 PRE-OP EXAM: ICD-10-CM

## 2022-08-22 DIAGNOSIS — F32.A ANXIETY AND DEPRESSION: ICD-10-CM

## 2022-08-22 LAB
BASOPHILS ABSOLUTE: 0.1 K/UL (ref 0–0.2)
BASOPHILS RELATIVE PERCENT: 0.7 %
EOSINOPHILS ABSOLUTE: 0.2 K/UL (ref 0–0.6)
EOSINOPHILS RELATIVE PERCENT: 2.3 %
HCT VFR BLD CALC: 41.2 % (ref 36–48)
HEMOGLOBIN: 14.3 G/DL (ref 12–16)
LYMPHOCYTES ABSOLUTE: 2.2 K/UL (ref 1–5.1)
LYMPHOCYTES RELATIVE PERCENT: 21.3 %
MCH RBC QN AUTO: 28.1 PG (ref 26–34)
MCHC RBC AUTO-ENTMCNC: 34.7 G/DL (ref 31–36)
MCV RBC AUTO: 81.1 FL (ref 80–100)
MONOCYTES ABSOLUTE: 0.6 K/UL (ref 0–1.3)
MONOCYTES RELATIVE PERCENT: 5.8 %
NEUTROPHILS ABSOLUTE: 7.3 K/UL (ref 1.7–7.7)
NEUTROPHILS RELATIVE PERCENT: 69.9 %
PDW BLD-RTO: 12.7 % (ref 12.4–15.4)
PLATELET # BLD: 246 K/UL (ref 135–450)
PMV BLD AUTO: 8 FL (ref 5–10.5)
RBC # BLD: 5.08 M/UL (ref 4–5.2)
WBC # BLD: 10.5 K/UL (ref 4–11)

## 2022-08-22 PROCEDURE — 90471 IMMUNIZATION ADMIN: CPT | Performed by: FAMILY MEDICINE

## 2022-08-22 PROCEDURE — 99213 OFFICE O/P EST LOW 20 MIN: CPT | Performed by: FAMILY MEDICINE

## 2022-08-22 PROCEDURE — 93000 ELECTROCARDIOGRAM COMPLETE: CPT | Performed by: FAMILY MEDICINE

## 2022-08-22 PROCEDURE — 90677 PCV20 VACCINE IM: CPT | Performed by: FAMILY MEDICINE

## 2022-08-22 SDOH — ECONOMIC STABILITY: FOOD INSECURITY: WITHIN THE PAST 12 MONTHS, YOU WORRIED THAT YOUR FOOD WOULD RUN OUT BEFORE YOU GOT MONEY TO BUY MORE.: NEVER TRUE

## 2022-08-22 SDOH — ECONOMIC STABILITY: FOOD INSECURITY: WITHIN THE PAST 12 MONTHS, THE FOOD YOU BOUGHT JUST DIDN'T LAST AND YOU DIDN'T HAVE MONEY TO GET MORE.: NEVER TRUE

## 2022-08-22 ASSESSMENT — SOCIAL DETERMINANTS OF HEALTH (SDOH): HOW HARD IS IT FOR YOU TO PAY FOR THE VERY BASICS LIKE FOOD, HOUSING, MEDICAL CARE, AND HEATING?: NOT HARD AT ALL

## 2022-08-23 LAB
A/G RATIO: 1.8 (ref 1.1–2.2)
ALBUMIN SERPL-MCNC: 4.5 G/DL (ref 3.4–5)
ALP BLD-CCNC: 75 U/L (ref 40–129)
ALT SERPL-CCNC: 23 U/L (ref 10–40)
ANION GAP SERPL CALCULATED.3IONS-SCNC: 13 MMOL/L (ref 3–16)
AST SERPL-CCNC: 18 U/L (ref 15–37)
BILIRUB SERPL-MCNC: <0.2 MG/DL (ref 0–1)
BUN BLDV-MCNC: 11 MG/DL (ref 7–20)
CALCIUM SERPL-MCNC: 9.2 MG/DL (ref 8.3–10.6)
CHLORIDE BLD-SCNC: 104 MMOL/L (ref 99–110)
CO2: 23 MMOL/L (ref 21–32)
CREAT SERPL-MCNC: 0.6 MG/DL (ref 0.6–1.1)
GFR AFRICAN AMERICAN: >60
GFR NON-AFRICAN AMERICAN: >60
GLUCOSE BLD-MCNC: 88 MG/DL (ref 70–99)
HEPATITIS C ANTIBODY INTERPRETATION: NORMAL
HIV AG/AB: NORMAL
HIV ANTIGEN: NORMAL
HIV-1 ANTIBODY: NORMAL
HIV-2 AB: NORMAL
POTASSIUM SERPL-SCNC: 4.3 MMOL/L (ref 3.5–5.1)
SODIUM BLD-SCNC: 140 MMOL/L (ref 136–145)
TOTAL PROTEIN: 7 G/DL (ref 6.4–8.2)

## 2022-08-26 NOTE — PROGRESS NOTES
Place patient label inside box (if no patient label, complete below)  Name:  :  MR#:   Margarita Garcia / PROCEDURE  I (we), Santosh Werner (Patient Name) authorize Cesar Garcia (Provider / Pepito Vaughn) and/or such assistants as may be selected by him/her, to perform the following operation/procedure(s): ROBOTIC ASSISTED LAPAROSCOPIC SLEEVE GASTRECTOMY       Note: If unable to obtain consent prior to an emergent procedure, document the emergent reason in the medical record. This procedure has been explained to my (our) satisfaction and included in the explanation was: The intended benefit, nature, and extent of the procedure to be performed; The significant risks involved and the probability of success; Alternative procedures and methods of treatment; The dangers and probable consequences of such alternatives (including no procedure or treatment); The expected consequences of the procedure on my future health; Whether other qualified individuals would be performing important surgical tasks and/or whether  would be present to advise or support the procedure. I (we) understand that there are other risks of infection and other serious complications in the pre-operative/procedural and postoperative/procedural stages of my (our) care. I (we) have asked all of the questions which I (we) thought were important in deciding whether or not to undergo treatment or diagnosis. These questions have been answered to my (our) satisfaction. I (we) understand that no assurance can be given that the procedure will be a success, and no guarantee or warranty of success has been given to me (us). It has been explained to me (us) that during the course of the operation/procedure, unforeseen conditions may be revealed that necessitate extension of the original procedure(s) or different procedure(s) than those set forth in Paragraph 1.  I (we) authorize and request that the above-named physician, his/her assistants or his/her designees, perform procedures as necessary and desirable if deemed to be in my (our) best interest.     Revised 8/2/2021                                                                          Page 1 of 2         I acknowledge that health care personnel may be observing this procedure for the purpose of medical education or other specified purposes as may be necessary as requested and/or approved by my (our) physician. I (we) consent to the disposal by the hospital Pathologist of the removed tissue, parts or organs in accordance with hospital policy. I do ____ do not ____ consent to the use of a local infiltration pain blocking agent that will be used by my provider/surgical provider to help alleviate pain during my procedure. I do ____ do not ____ consent to an emergent blood transfusion in the case of a life-threatening situation that requires blood components to be administered. This consent is valid for 24 hours from the beginning of the procedure. This patient does ____ or does not ____ currently have a DNR status/order. If DNR order is in place, obtain Addendum to the Surgical Consent for ALL Patients with a DNR Order to address kelvin-operative status for limited intervention or DNR suspension.      I have read and fully understand the above Consent for Operation/Procedure and that all blanks were completed before I signed the consent.   _____________________________       _____________________      ____/____am/pm  Signature of Patient or legal representative      Printed Name / Relationship            Date / Time   ____________________________       _____________________      ____/____am/pm  Witness to Signature                                    Printed Name                    Date / Time    If patient is unable to sign or is a minor, complete the following)  Patient is a minor, ____ years of age, or unable to sign because: ______________________________________________________________________________________________    If a phone consent is obtained, consent will be documented by using two health care professionals, each affirming that the consenting party has no questions and gives consent for the procedure discussed with the physician/provider.   _____________________          ____________________       _____/_____am/pm   2nd witness to phone consent        Printed name           Date / Time    Informed Consent:  I have provided the explanation described above in section 1 to the patient and/or legal representative.  I have provided the patient and/or legal representative with an opportunity to ask any questions about the proposed operation/procedure.   ___________________________          ____________________         ____/____am/pm  Provider / Proceduralist                            Printed name            Date / Time  Revised 8/2/2021                                                                      Page 2 of 2

## 2022-08-30 ENCOUNTER — PATIENT MESSAGE (OUTPATIENT)
Dept: BARIATRICS/WEIGHT MGMT | Age: 32
End: 2022-08-30

## 2022-08-31 NOTE — TELEPHONE ENCOUNTER
Clinicals requested again and those were faxed on 8/25, with a fax confirmation of success. Called inocencio again to check status, stated still no clinicals received. Uploaded to availity on 8/18 as well. Asked to fax the clinicals again to two separate fax numbers for now an urgent/expedited review.      Spoke with [place]#[phone]

## 2022-09-01 NOTE — TELEPHONE ENCOUNTER
Transaction ID: [id number] ID: [id number] Date: 2022-09-01  Mack Turner Patient  Member ID  [id number]    Date of Birth  1990    Gender  NA    Transaction Type  Inpatient Authorization    Organization  [facility]    Payer  ALOK  Edwin Angel Hernandez [employer] logo      Certificate Information  Certification Number  [id number]    Status  CERTIFIED IN TOTAL    Service Information  Service Type  2 - Surgical    Place of Service  [address]    Admission - Discharge Date  2022-09-06 - 2022-09-06    Admission Type  Urgent    Diagnosis Code 1      Diagnosis Code 2  E782    Level of Care 1  0160 - Room & Board-Other-General    From - To Date  2022-09-06 - 0517-79-04    Status  CERTIFIED IN TOTAL    Message  MEETS CRITERIA/GUIDELINES    Procedure Code 1 (CPT/HCPCS)  43267    Procedure Service Quantity  1 Units    Procedure From - To Date  2022-09-06 - 9264-89-24    Status  CERTIFIED IN TOTAL    Message  MEETS CRITERIA/GUIDELINES    Requesting Provider     Name  Luis Rizzo    [facility]  [id number]    Tax Id  [id number]    Provider Role  Provider    Address  [address], [facility], [facility] [address]    Phone  [phone]  Contact Name  [facility]    Rendering Providers     Provider 1  Name  James Tuttle  [id number]    Tax Id  [id number]    Provider Role  Service Provider    Address  [address], [address] [facility], [facility], [facility] [address]    Provider 2  Name  [address] [place] [address]  [id number]    Tax Id  [id number]    Provider Role  Facility    Address  [place] [address], [place], [address] [address]

## 2022-09-02 ENCOUNTER — TELEPHONE (OUTPATIENT)
Dept: BARIATRICS/WEIGHT MGMT | Age: 32
End: 2022-09-02

## 2022-09-02 ENCOUNTER — PATIENT MESSAGE (OUTPATIENT)
Dept: BARIATRICS/WEIGHT MGMT | Age: 32
End: 2022-09-02

## 2022-09-02 RX ORDER — SODIUM CHLORIDE 9 MG/ML
INJECTION, SOLUTION INTRAVENOUS CONTINUOUS
Status: CANCELLED | OUTPATIENT
Start: 2022-09-02

## 2022-09-02 NOTE — TELEPHONE ENCOUNTER
Spoke with pt to confirm her 5:30 am arrival for her 7:30 am surgery on 9/6/22. Pt states she is currently following her pre op diet, reminded to follow her clear, liquid diet on 9/5 and NPO after midnight.

## 2022-09-04 ENCOUNTER — ANESTHESIA EVENT (OUTPATIENT)
Dept: OPERATING ROOM | Age: 32
DRG: 621 | End: 2022-09-04
Payer: COMMERCIAL

## 2022-09-06 ENCOUNTER — HOSPITAL ENCOUNTER (INPATIENT)
Age: 32
LOS: 1 days | Discharge: HOME | DRG: 621 | End: 2022-09-07
Attending: SURGERY | Admitting: SURGERY
Payer: COMMERCIAL

## 2022-09-06 ENCOUNTER — ANESTHESIA (OUTPATIENT)
Dept: OPERATING ROOM | Age: 32
DRG: 621 | End: 2022-09-06
Payer: COMMERCIAL

## 2022-09-06 DIAGNOSIS — R10.9 ACUTE POSTOPERATIVE PAIN OF ABDOMEN: Primary | ICD-10-CM

## 2022-09-06 DIAGNOSIS — E66.01 MORBID OBESITY (HCC): ICD-10-CM

## 2022-09-06 DIAGNOSIS — G89.18 ACUTE POSTOPERATIVE PAIN OF ABDOMEN: Primary | ICD-10-CM

## 2022-09-06 LAB
ABO/RH: NORMAL
ANTIBODY SCREEN: NORMAL
GLUCOSE BLD-MCNC: 114 MG/DL (ref 70–99)
PERFORMED ON: ABNORMAL
PREGNANCY, URINE: NEGATIVE

## 2022-09-06 PROCEDURE — 2580000003 HC RX 258: Performed by: FAMILY MEDICINE

## 2022-09-06 PROCEDURE — 94761 N-INVAS EAR/PLS OXIMETRY MLT: CPT

## 2022-09-06 PROCEDURE — 86900 BLOOD TYPING SEROLOGIC ABO: CPT

## 2022-09-06 PROCEDURE — 2500000003 HC RX 250 WO HCPCS: Performed by: NURSE ANESTHETIST, CERTIFIED REGISTERED

## 2022-09-06 PROCEDURE — 6360000002 HC RX W HCPCS: Performed by: SURGERY

## 2022-09-06 PROCEDURE — 6360000002 HC RX W HCPCS: Performed by: NURSE ANESTHETIST, CERTIFIED REGISTERED

## 2022-09-06 PROCEDURE — 2500000003 HC RX 250 WO HCPCS: Performed by: SURGERY

## 2022-09-06 PROCEDURE — 86901 BLOOD TYPING SEROLOGIC RH(D): CPT

## 2022-09-06 PROCEDURE — 2700000000 HC OXYGEN THERAPY PER DAY

## 2022-09-06 PROCEDURE — 3700000001 HC ADD 15 MINUTES (ANESTHESIA): Performed by: SURGERY

## 2022-09-06 PROCEDURE — S2900 ROBOTIC SURGICAL SYSTEM: HCPCS | Performed by: SURGERY

## 2022-09-06 PROCEDURE — A4217 STERILE WATER/SALINE, 500 ML: HCPCS | Performed by: SURGERY

## 2022-09-06 PROCEDURE — 84703 CHORIONIC GONADOTROPIN ASSAY: CPT

## 2022-09-06 PROCEDURE — 8E0W4CZ ROBOTIC ASSISTED PROCEDURE OF TRUNK REGION, PERCUTANEOUS ENDOSCOPIC APPROACH: ICD-10-PCS | Performed by: SURGERY

## 2022-09-06 PROCEDURE — 6370000000 HC RX 637 (ALT 250 FOR IP): Performed by: SURGERY

## 2022-09-06 PROCEDURE — 7100000000 HC PACU RECOVERY - FIRST 15 MIN: Performed by: SURGERY

## 2022-09-06 PROCEDURE — 2580000003 HC RX 258: Performed by: SURGERY

## 2022-09-06 PROCEDURE — 88307 TISSUE EXAM BY PATHOLOGIST: CPT

## 2022-09-06 PROCEDURE — 86850 RBC ANTIBODY SCREEN: CPT

## 2022-09-06 PROCEDURE — C9113 INJ PANTOPRAZOLE SODIUM, VIA: HCPCS | Performed by: SURGERY

## 2022-09-06 PROCEDURE — 2720000010 HC SURG SUPPLY STERILE: Performed by: SURGERY

## 2022-09-06 PROCEDURE — 1200000000 HC SEMI PRIVATE

## 2022-09-06 PROCEDURE — 2709999900 HC NON-CHARGEABLE SUPPLY: Performed by: SURGERY

## 2022-09-06 PROCEDURE — 0DB64Z3 EXCISION OF STOMACH, PERCUTANEOUS ENDOSCOPIC APPROACH, VERTICAL: ICD-10-PCS | Performed by: SURGERY

## 2022-09-06 PROCEDURE — 6360000002 HC RX W HCPCS: Performed by: NURSE PRACTITIONER

## 2022-09-06 PROCEDURE — 6360000002 HC RX W HCPCS: Performed by: ANESTHESIOLOGY

## 2022-09-06 PROCEDURE — 43775 LAP SLEEVE GASTRECTOMY: CPT | Performed by: SURGERY

## 2022-09-06 PROCEDURE — 7100000001 HC PACU RECOVERY - ADDTL 15 MIN: Performed by: SURGERY

## 2022-09-06 PROCEDURE — 3600000019 HC SURGERY ROBOT ADDTL 15MIN: Performed by: SURGERY

## 2022-09-06 PROCEDURE — 3600000009 HC SURGERY ROBOT BASE: Performed by: SURGERY

## 2022-09-06 PROCEDURE — 3700000000 HC ANESTHESIA ATTENDED CARE: Performed by: SURGERY

## 2022-09-06 RX ORDER — MEPERIDINE HYDROCHLORIDE 25 MG/ML
12.5 INJECTION INTRAMUSCULAR; INTRAVENOUS; SUBCUTANEOUS EVERY 5 MIN PRN
Status: DISCONTINUED | OUTPATIENT
Start: 2022-09-06 | End: 2022-09-06 | Stop reason: HOSPADM

## 2022-09-06 RX ORDER — SODIUM CHLORIDE 9 MG/ML
INJECTION, SOLUTION INTRAVENOUS PRN
Status: DISCONTINUED | OUTPATIENT
Start: 2022-09-06 | End: 2022-09-07 | Stop reason: HOSPADM

## 2022-09-06 RX ORDER — APREPITANT 40 MG/1
80 CAPSULE ORAL ONCE
Status: COMPLETED | OUTPATIENT
Start: 2022-09-06 | End: 2022-09-06

## 2022-09-06 RX ORDER — NALOXONE HYDROCHLORIDE 0.4 MG/ML
INJECTION, SOLUTION INTRAMUSCULAR; INTRAVENOUS; SUBCUTANEOUS PRN
Status: DISCONTINUED | OUTPATIENT
Start: 2022-09-06 | End: 2022-09-07

## 2022-09-06 RX ORDER — CIPROFLOXACIN 2 MG/ML
400 INJECTION, SOLUTION INTRAVENOUS ONCE
Status: COMPLETED | OUTPATIENT
Start: 2022-09-06 | End: 2022-09-06

## 2022-09-06 RX ORDER — ENALAPRILAT 2.5 MG/2ML
1.25 INJECTION INTRAVENOUS EVERY 6 HOURS PRN
Status: DISCONTINUED | OUTPATIENT
Start: 2022-09-06 | End: 2022-09-07 | Stop reason: HOSPADM

## 2022-09-06 RX ORDER — OXYCODONE HYDROCHLORIDE 5 MG/1
5 TABLET ORAL PRN
Status: DISCONTINUED | OUTPATIENT
Start: 2022-09-06 | End: 2022-09-06 | Stop reason: HOSPADM

## 2022-09-06 RX ORDER — METOCLOPRAMIDE HYDROCHLORIDE 5 MG/ML
INJECTION INTRAMUSCULAR; INTRAVENOUS PRN
Status: DISCONTINUED | OUTPATIENT
Start: 2022-09-06 | End: 2022-09-06 | Stop reason: SDUPTHER

## 2022-09-06 RX ORDER — ONDANSETRON 2 MG/ML
4 INJECTION INTRAMUSCULAR; INTRAVENOUS EVERY 6 HOURS PRN
Status: DISCONTINUED | OUTPATIENT
Start: 2022-09-06 | End: 2022-09-07 | Stop reason: HOSPADM

## 2022-09-06 RX ORDER — SODIUM CHLORIDE 0.9 % (FLUSH) 0.9 %
5-40 SYRINGE (ML) INJECTION PRN
Status: DISCONTINUED | OUTPATIENT
Start: 2022-09-06 | End: 2022-09-06 | Stop reason: HOSPADM

## 2022-09-06 RX ORDER — PROMETHAZINE HYDROCHLORIDE 25 MG/ML
12.5 INJECTION, SOLUTION INTRAMUSCULAR; INTRAVENOUS ONCE
Status: COMPLETED | OUTPATIENT
Start: 2022-09-06 | End: 2022-09-06

## 2022-09-06 RX ORDER — LABETALOL HYDROCHLORIDE 5 MG/ML
10 INJECTION, SOLUTION INTRAVENOUS
Status: DISCONTINUED | OUTPATIENT
Start: 2022-09-06 | End: 2022-09-06 | Stop reason: HOSPADM

## 2022-09-06 RX ORDER — SODIUM CHLORIDE 9 MG/ML
INJECTION, SOLUTION INTRAVENOUS CONTINUOUS
Status: DISCONTINUED | OUTPATIENT
Start: 2022-09-06 | End: 2022-09-07 | Stop reason: HOSPADM

## 2022-09-06 RX ORDER — HYDRALAZINE HYDROCHLORIDE 20 MG/ML
10 INJECTION INTRAMUSCULAR; INTRAVENOUS
Status: DISCONTINUED | OUTPATIENT
Start: 2022-09-06 | End: 2022-09-06 | Stop reason: HOSPADM

## 2022-09-06 RX ORDER — SODIUM CHLORIDE 9 MG/ML
25 INJECTION, SOLUTION INTRAVENOUS PRN
Status: DISCONTINUED | OUTPATIENT
Start: 2022-09-06 | End: 2022-09-06 | Stop reason: HOSPADM

## 2022-09-06 RX ORDER — ENOXAPARIN SODIUM 100 MG/ML
40 INJECTION SUBCUTANEOUS EVERY 12 HOURS SCHEDULED
Status: DISCONTINUED | OUTPATIENT
Start: 2022-09-06 | End: 2022-09-07 | Stop reason: HOSPADM

## 2022-09-06 RX ORDER — DIPHENHYDRAMINE HYDROCHLORIDE 50 MG/ML
12.5 INJECTION INTRAMUSCULAR; INTRAVENOUS
Status: DISCONTINUED | OUTPATIENT
Start: 2022-09-06 | End: 2022-09-06 | Stop reason: HOSPADM

## 2022-09-06 RX ORDER — CLINDAMYCIN PHOSPHATE 900 MG/50ML
900 INJECTION INTRAVENOUS ONCE
Status: COMPLETED | OUTPATIENT
Start: 2022-09-06 | End: 2022-09-06

## 2022-09-06 RX ORDER — SCOLOPAMINE TRANSDERMAL SYSTEM 1 MG/1
1 PATCH, EXTENDED RELEASE TRANSDERMAL ONCE
Status: DISCONTINUED | OUTPATIENT
Start: 2022-09-06 | End: 2022-09-07 | Stop reason: HOSPADM

## 2022-09-06 RX ORDER — SODIUM CHLORIDE, SODIUM LACTATE, POTASSIUM CHLORIDE, CALCIUM CHLORIDE 600; 310; 30; 20 MG/100ML; MG/100ML; MG/100ML; MG/100ML
INJECTION, SOLUTION INTRAVENOUS CONTINUOUS
Status: DISCONTINUED | OUTPATIENT
Start: 2022-09-06 | End: 2022-09-06 | Stop reason: HOSPADM

## 2022-09-06 RX ORDER — SCOLOPAMINE TRANSDERMAL SYSTEM 1 MG/1
1 PATCH, EXTENDED RELEASE TRANSDERMAL
Status: DISCONTINUED | OUTPATIENT
Start: 2022-09-06 | End: 2022-09-07 | Stop reason: HOSPADM

## 2022-09-06 RX ORDER — HYOSCYAMINE SULFATE 0.5 MG/ML
250 INJECTION, SOLUTION SUBCUTANEOUS EVERY 4 HOURS PRN
Status: DISCONTINUED | OUTPATIENT
Start: 2022-09-06 | End: 2022-09-07 | Stop reason: HOSPADM

## 2022-09-06 RX ORDER — SODIUM CHLORIDE 0.9 % (FLUSH) 0.9 %
5-40 SYRINGE (ML) INJECTION EVERY 12 HOURS SCHEDULED
Status: DISCONTINUED | OUTPATIENT
Start: 2022-09-06 | End: 2022-09-06 | Stop reason: HOSPADM

## 2022-09-06 RX ORDER — HYDRALAZINE HYDROCHLORIDE 20 MG/ML
INJECTION INTRAMUSCULAR; INTRAVENOUS PRN
Status: DISCONTINUED | OUTPATIENT
Start: 2022-09-06 | End: 2022-09-06 | Stop reason: SDUPTHER

## 2022-09-06 RX ORDER — POLYETHYLENE GLYCOL 3350 17 G/17G
17 POWDER, FOR SOLUTION ORAL DAILY PRN
Status: DISCONTINUED | OUTPATIENT
Start: 2022-09-06 | End: 2022-09-07

## 2022-09-06 RX ORDER — CIPROFLOXACIN 2 MG/ML
400 INJECTION, SOLUTION INTRAVENOUS EVERY 12 HOURS
Status: COMPLETED | OUTPATIENT
Start: 2022-09-06 | End: 2022-09-07

## 2022-09-06 RX ORDER — LIDOCAINE HYDROCHLORIDE 20 MG/ML
INJECTION, SOLUTION INTRAVENOUS PRN
Status: DISCONTINUED | OUTPATIENT
Start: 2022-09-06 | End: 2022-09-06 | Stop reason: SDUPTHER

## 2022-09-06 RX ORDER — GLYCOPYRROLATE 1 MG/5 ML
SYRINGE (ML) INTRAVENOUS PRN
Status: DISCONTINUED | OUTPATIENT
Start: 2022-09-06 | End: 2022-09-06 | Stop reason: SDUPTHER

## 2022-09-06 RX ORDER — PREGABALIN 150 MG/1
150 CAPSULE ORAL ONCE
Status: COMPLETED | OUTPATIENT
Start: 2022-09-06 | End: 2022-09-06

## 2022-09-06 RX ORDER — BUPIVACAINE HYDROCHLORIDE 5 MG/ML
INJECTION, SOLUTION EPIDURAL; INTRACAUDAL PRN
Status: DISCONTINUED | OUTPATIENT
Start: 2022-09-06 | End: 2022-09-06 | Stop reason: HOSPADM

## 2022-09-06 RX ORDER — ACETAMINOPHEN 160 MG/5ML
650 SOLUTION ORAL ONCE
Status: COMPLETED | OUTPATIENT
Start: 2022-09-06 | End: 2022-09-06

## 2022-09-06 RX ORDER — PROCHLORPERAZINE EDISYLATE 5 MG/ML
10 INJECTION INTRAMUSCULAR; INTRAVENOUS EVERY 6 HOURS PRN
Status: DISCONTINUED | OUTPATIENT
Start: 2022-09-06 | End: 2022-09-07 | Stop reason: HOSPADM

## 2022-09-06 RX ORDER — SODIUM CHLORIDE 0.9 % (FLUSH) 0.9 %
5-40 SYRINGE (ML) INJECTION PRN
Status: DISCONTINUED | OUTPATIENT
Start: 2022-09-06 | End: 2022-09-07 | Stop reason: HOSPADM

## 2022-09-06 RX ORDER — ONDANSETRON 2 MG/ML
INJECTION INTRAMUSCULAR; INTRAVENOUS PRN
Status: DISCONTINUED | OUTPATIENT
Start: 2022-09-06 | End: 2022-09-06 | Stop reason: SDUPTHER

## 2022-09-06 RX ORDER — PROPOFOL 10 MG/ML
INJECTION, EMULSION INTRAVENOUS PRN
Status: DISCONTINUED | OUTPATIENT
Start: 2022-09-06 | End: 2022-09-06 | Stop reason: SDUPTHER

## 2022-09-06 RX ORDER — MAGNESIUM HYDROXIDE 1200 MG/15ML
LIQUID ORAL CONTINUOUS PRN
Status: DISCONTINUED | OUTPATIENT
Start: 2022-09-06 | End: 2022-09-06 | Stop reason: HOSPADM

## 2022-09-06 RX ORDER — SODIUM CHLORIDE 9 MG/ML
INJECTION, SOLUTION INTRAVENOUS PRN
Status: DISCONTINUED | OUTPATIENT
Start: 2022-09-06 | End: 2022-09-06 | Stop reason: HOSPADM

## 2022-09-06 RX ORDER — METOPROLOL TARTRATE 5 MG/5ML
INJECTION INTRAVENOUS PRN
Status: DISCONTINUED | OUTPATIENT
Start: 2022-09-06 | End: 2022-09-06 | Stop reason: SDUPTHER

## 2022-09-06 RX ORDER — OXYCODONE HYDROCHLORIDE 5 MG/1
10 TABLET ORAL PRN
Status: DISCONTINUED | OUTPATIENT
Start: 2022-09-06 | End: 2022-09-06 | Stop reason: HOSPADM

## 2022-09-06 RX ORDER — HYDROMORPHONE HCL 110MG/55ML
PATIENT CONTROLLED ANALGESIA SYRINGE INTRAVENOUS PRN
Status: DISCONTINUED | OUTPATIENT
Start: 2022-09-06 | End: 2022-09-06 | Stop reason: SDUPTHER

## 2022-09-06 RX ORDER — FAMOTIDINE 10 MG/ML
INJECTION, SOLUTION INTRAVENOUS PRN
Status: DISCONTINUED | OUTPATIENT
Start: 2022-09-06 | End: 2022-09-06 | Stop reason: SDUPTHER

## 2022-09-06 RX ORDER — ENOXAPARIN SODIUM 100 MG/ML
40 INJECTION SUBCUTANEOUS ONCE
Status: COMPLETED | OUTPATIENT
Start: 2022-09-06 | End: 2022-09-06

## 2022-09-06 RX ORDER — METOCLOPRAMIDE HYDROCHLORIDE 5 MG/ML
10 INJECTION INTRAMUSCULAR; INTRAVENOUS
Status: DISCONTINUED | OUTPATIENT
Start: 2022-09-06 | End: 2022-09-06 | Stop reason: HOSPADM

## 2022-09-06 RX ORDER — SODIUM CHLORIDE 0.9 % (FLUSH) 0.9 %
5-40 SYRINGE (ML) INJECTION EVERY 12 HOURS SCHEDULED
Status: DISCONTINUED | OUTPATIENT
Start: 2022-09-06 | End: 2022-09-07 | Stop reason: HOSPADM

## 2022-09-06 RX ORDER — DEXMEDETOMIDINE HYDROCHLORIDE 100 UG/ML
INJECTION, SOLUTION INTRAVENOUS PRN
Status: DISCONTINUED | OUTPATIENT
Start: 2022-09-06 | End: 2022-09-06 | Stop reason: SDUPTHER

## 2022-09-06 RX ORDER — METRONIDAZOLE 500 MG/100ML
500 INJECTION, SOLUTION INTRAVENOUS EVERY 8 HOURS
Status: DISPENSED | OUTPATIENT
Start: 2022-09-06 | End: 2022-09-07

## 2022-09-06 RX ORDER — ROCURONIUM BROMIDE 10 MG/ML
INJECTION, SOLUTION INTRAVENOUS PRN
Status: DISCONTINUED | OUTPATIENT
Start: 2022-09-06 | End: 2022-09-06 | Stop reason: SDUPTHER

## 2022-09-06 RX ORDER — METOCLOPRAMIDE HYDROCHLORIDE 5 MG/ML
10 INJECTION INTRAMUSCULAR; INTRAVENOUS EVERY 6 HOURS PRN
Status: DISCONTINUED | OUTPATIENT
Start: 2022-09-06 | End: 2022-09-07 | Stop reason: HOSPADM

## 2022-09-06 RX ADMIN — CIPROFLOXACIN 400 MG: 2 INJECTION, SOLUTION INTRAVENOUS at 17:14

## 2022-09-06 RX ADMIN — DEXMEDETOMIDINE HYDROCHLORIDE 8 MCG: 100 INJECTION, SOLUTION INTRAVENOUS at 07:47

## 2022-09-06 RX ADMIN — CIPROFLOXACIN 400 MG: 2 INJECTION, SOLUTION INTRAVENOUS at 07:51

## 2022-09-06 RX ADMIN — SODIUM CHLORIDE, SODIUM LACTATE, POTASSIUM CHLORIDE, AND CALCIUM CHLORIDE: .6; .31; .03; .02 INJECTION, SOLUTION INTRAVENOUS at 09:18

## 2022-09-06 RX ADMIN — SODIUM CHLORIDE, POTASSIUM CHLORIDE, SODIUM LACTATE AND CALCIUM CHLORIDE: 600; 310; 30; 20 INJECTION, SOLUTION INTRAVENOUS at 07:00

## 2022-09-06 RX ADMIN — CLINDAMYCIN PHOSPHATE 900 MG: 900 INJECTION, SOLUTION INTRAVENOUS at 07:43

## 2022-09-06 RX ADMIN — LIDOCAINE HYDROCHLORIDE 100 MG: 20 INJECTION, SOLUTION INTRAVENOUS at 07:40

## 2022-09-06 RX ADMIN — PROMETHAZINE HYDROCHLORIDE 12.5 MG: 25 INJECTION INTRAMUSCULAR; INTRAVENOUS at 10:50

## 2022-09-06 RX ADMIN — PROPOFOL 100 MG: 10 INJECTION, EMULSION INTRAVENOUS at 09:38

## 2022-09-06 RX ADMIN — METOPROLOL TARTRATE 2 MG: 5 INJECTION INTRAVENOUS at 09:39

## 2022-09-06 RX ADMIN — SUGAMMADEX 200 MG: 100 INJECTION, SOLUTION INTRAVENOUS at 09:51

## 2022-09-06 RX ADMIN — PREGABALIN 150 MG: 150 CAPSULE ORAL at 06:26

## 2022-09-06 RX ADMIN — DEXMEDETOMIDINE HYDROCHLORIDE 6 MCG: 100 INJECTION, SOLUTION INTRAVENOUS at 07:37

## 2022-09-06 RX ADMIN — HYDROMORPHONE HYDROCHLORIDE 2 MG: 2 INJECTION, SOLUTION INTRAMUSCULAR; INTRAVENOUS; SUBCUTANEOUS at 07:40

## 2022-09-06 RX ADMIN — METOCLOPRAMIDE 10 MG: 5 INJECTION, SOLUTION INTRAMUSCULAR; INTRAVENOUS at 07:44

## 2022-09-06 RX ADMIN — Medication 0.2 MG: at 08:18

## 2022-09-06 RX ADMIN — METOCLOPRAMIDE HYDROCHLORIDE 10 MG: 5 INJECTION INTRAMUSCULAR; INTRAVENOUS at 22:08

## 2022-09-06 RX ADMIN — PROPOFOL 100 MG: 10 INJECTION, EMULSION INTRAVENOUS at 09:51

## 2022-09-06 RX ADMIN — SODIUM CHLORIDE: 9 INJECTION, SOLUTION INTRAVENOUS at 11:27

## 2022-09-06 RX ADMIN — APREPITANT 80 MG: 40 CAPSULE ORAL at 06:27

## 2022-09-06 RX ADMIN — SODIUM CHLORIDE, SODIUM LACTATE, POTASSIUM CHLORIDE, AND CALCIUM CHLORIDE: .6; .31; .03; .02 INJECTION, SOLUTION INTRAVENOUS at 07:26

## 2022-09-06 RX ADMIN — Medication: at 11:44

## 2022-09-06 RX ADMIN — SODIUM CHLORIDE, PRESERVATIVE FREE 40 MG: 5 INJECTION INTRAVENOUS at 17:15

## 2022-09-06 RX ADMIN — FAMOTIDINE 20 MG: 10 INJECTION, SOLUTION INTRAVENOUS at 07:44

## 2022-09-06 RX ADMIN — PROCHLORPERAZINE EDISYLATE 10 MG: 5 INJECTION INTRAMUSCULAR; INTRAVENOUS at 14:02

## 2022-09-06 RX ADMIN — PROPOFOL 100 MG: 10 INJECTION, EMULSION INTRAVENOUS at 07:41

## 2022-09-06 RX ADMIN — PHENYLEPHRINE HYDROCHLORIDE 100 MCG: 10 INJECTION, SOLUTION INTRAMUSCULAR; INTRAVENOUS; SUBCUTANEOUS at 08:15

## 2022-09-06 RX ADMIN — ROCURONIUM BROMIDE 100 MG: 10 INJECTION INTRAVENOUS at 07:41

## 2022-09-06 RX ADMIN — HYDRALAZINE HYDROCHLORIDE 6 MG: 20 INJECTION INTRAMUSCULAR; INTRAVENOUS at 09:40

## 2022-09-06 RX ADMIN — PROPOFOL 150 MG: 10 INJECTION, EMULSION INTRAVENOUS at 07:40

## 2022-09-06 RX ADMIN — HYDROMORPHONE HYDROCHLORIDE 0.5 MG: 1 INJECTION, SOLUTION INTRAMUSCULAR; INTRAVENOUS; SUBCUTANEOUS at 11:05

## 2022-09-06 RX ADMIN — ENOXAPARIN SODIUM 40 MG: 100 INJECTION SUBCUTANEOUS at 06:27

## 2022-09-06 RX ADMIN — ACETAMINOPHEN 650 MG: 650 SOLUTION ORAL at 06:26

## 2022-09-06 RX ADMIN — ENOXAPARIN SODIUM 40 MG: 100 INJECTION SUBCUTANEOUS at 22:10

## 2022-09-06 RX ADMIN — ONDANSETRON 8 MG: 2 INJECTION INTRAMUSCULAR; INTRAVENOUS at 07:37

## 2022-09-06 RX ADMIN — DEXMEDETOMIDINE HYDROCHLORIDE 6 MCG: 100 INJECTION, SOLUTION INTRAVENOUS at 07:42

## 2022-09-06 ASSESSMENT — PAIN DESCRIPTION - DESCRIPTORS
DESCRIPTORS: ACHING
DESCRIPTORS: DISCOMFORT
DESCRIPTORS: DISCOMFORT

## 2022-09-06 ASSESSMENT — PAIN SCALES - GENERAL
PAINLEVEL_OUTOF10: 0
PAINLEVEL_OUTOF10: 4
PAINLEVEL_OUTOF10: 0
PAINLEVEL_OUTOF10: 2
PAINLEVEL_OUTOF10: 2
PAINLEVEL_OUTOF10: 6
PAINLEVEL_OUTOF10: 2
PAINLEVEL_OUTOF10: 0
PAINLEVEL_OUTOF10: 0

## 2022-09-06 ASSESSMENT — PAIN DESCRIPTION - FREQUENCY
FREQUENCY: INTERMITTENT
FREQUENCY: INTERMITTENT

## 2022-09-06 ASSESSMENT — PAIN - FUNCTIONAL ASSESSMENT
PAIN_FUNCTIONAL_ASSESSMENT: ACTIVITIES ARE NOT PREVENTED
PAIN_FUNCTIONAL_ASSESSMENT: ACTIVITIES ARE NOT PREVENTED

## 2022-09-06 ASSESSMENT — PAIN DESCRIPTION - PAIN TYPE
TYPE: SURGICAL PAIN
TYPE: SURGICAL PAIN

## 2022-09-06 ASSESSMENT — PAIN DESCRIPTION - ORIENTATION
ORIENTATION: MID

## 2022-09-06 ASSESSMENT — PAIN DESCRIPTION - ONSET
ONSET: GRADUAL
ONSET: GRADUAL

## 2022-09-06 ASSESSMENT — PAIN DESCRIPTION - LOCATION
LOCATION: ABDOMEN

## 2022-09-06 NOTE — PROGRESS NOTES
Patient to PACU bed #10 post op Procedure Summary  Date: 09/06/22 Room / Location: [address] [place] [address] / [facility]   Anesthesia Start: 0730 Anesthesia Stop: 2466   Procedure: ROBOTIC ASSISTED LAPAROSCOPIC SLEEVE GASTRECTOMY (Abdomen) Diagnosis:        Morbid obesity ([facility] [address]       (Morbid obesity ([facility] [address] [facility] [E66.01])   Surgeons: Terence Guevara DO Responsible Provider: Corbin Perez MD   Anesthesia Type: Not recorded ASA Status: 3   Report received from [address] [place] [address]. VS stable. Abdomen round with 6 scope sites dermabond- intact, binder on. Good peripheral pulses present. Patient complains of nausea- spoke with Dr. Rosa Marin and phenergan ordered.

## 2022-09-06 NOTE — PROGRESS NOTES
PACU Transfer Note    Vitals:    09/06/22 1145   BP: 111/65   Pulse: 78   Resp: 12   Temp:    SpO2: 99%       In: 1904 [I.V.:1904]  Out: 20     Pain assessment:  none VS stable. Report given to [facility] RN at bedside. Patient to room # [phone] as scheduled.    Pain Level: 2    Report given to Receiving unit RN.    9/6/2022 11:55 AM
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
## 2022-09-06 NOTE — ANESTHESIA POSTPROCEDURE EVALUATION
Department of Anesthesiology  Postprocedure Note    Patient: Woo Partida  MRN: [id number]  YOB: 1990  Date of evaluation: 9/6/2022      Procedure Summary     Date: 09/06/22 Room / Location: [address] [place] [address]    Anesthesia Start: 0730 Anesthesia Stop: 9193    Procedure: ROBOTIC ASSISTED LAPAROSCOPIC SLEEVE GASTRECTOMY (Abdomen) Diagnosis:       Morbid obesity ([address]      (Morbid obesity ([address] [E66.01])    Surgeons: Fabiola Hunter DO Responsible Provider: Sandro Hayden MD    Anesthesia Type: general ASA Status: 3          Anesthesia Type: No value filed.     Ivan Phase I: Ivan Score: 8    Ivan Phase II:        Anesthesia Post Evaluation    Patient location during evaluation: PACU  Patient participation: complete - patient participated  Level of consciousness: awake and alert  Pain score: 2  Airway patency: patent  Nausea & Vomiting: no nausea and no vomiting  Complications: no  Cardiovascular status: hemodynamically stable  Respiratory status: acceptable  Hydration status: euvolemic

## 2022-09-06 NOTE — PROGRESS NOTES
Patient alert and oriented x4. Continues to be drowsy from surgery. VSS and has been weaned down to 1L of O2. Patient has been OOB and ambulated x1 assist to bathroom and urinated with no issues. Pain controlled with PCA pump. Patient denies any other needs at this time. Bed is in the lowest position, call light and bedside table within reach. Patient bed alarm is on. Will continue to monitor for changes in patient status.      Electronically signed by Maribel Cardona RN on 9/6/2022 at 2:38 PM

## 2022-09-06 NOTE — PROGRESS NOTES
Dilaudid PCA started as instructed with instructions. Family Sameera Mario updated of room #5507  Report given to [address]   [address]

## 2022-09-06 NOTE — PROGRESS NOTES
4 Eyes Admission Assessment     I agree as the admission nurse that 2 RN's have performed a thorough Head to Toe Skin Assessment on the patient. ALL assessment sites listed below have been assessed on admission. Areas assessed by both nurses:   [x]   Head, Face, and Ears   [x]   Shoulders, Back, and Chest  [x]   Arms, Elbows, and Hands   [x]   Coccyx, Sacrum, and Ischium  [x]   Legs, Feet, and Heels        Does the Patient have Skin Breakdown?   No         Jasper Prevention initiated:  No   Wound Care Orders initiated:  No      [facility] nurse consulted for Pressure Injury (Stage 3,4, Unstageable, DTI, NWPT, and Complex wounds) or Jasper score 18 or lower:  No      Nurse 1 eSignature: Electronically signed by Brendon Crigler, RN on 9/6/22 at 1:24 PM EDT    **SHARE this note so that the co-signing nurse is able to place an eSignature**    Nurse 2 eSignature: Electronically signed by Shena Hill RN on 9/6/22 at 1:25 PM EDT

## 2022-09-06 NOTE — DISCHARGE INSTRUCTIONS
Bariatric Surgery Dietary Discharge Instructions    You will be consuming clear liquids on post-op days 1 and 2. You need to drink at least 48-64oz of fluid daily for hydration. This will mean sipping fluids all day to prevent dehydration and the possibility of needing IV fluids for rehydration! Post-op Day 1  And 2   Wednesday & Thursday    Fluid Intake: Minimum of 48 oz of fluid each day, more as you are able. Sip fluids slowly  Do not eat and drink at the same time    All of the fluids you consume from now on will be non-carbonated, caffeine free and sugar-free. Drinks should contain 5 grams of sugar or less per 8 oz. serving. Refer to Clear Liquids List for a list of acceptable clear liquids. On post-op day 3 you can begin full liquids and pureed/blenderized foods. Staying hydrated is priority number one. The next priority is PROTEIN. Only after you get enough fluid in can you take protein. Only after you get fluids and protein can you try pureed. Post-op Day 3 Friday    Still make sure you are getting at least 48oz of fluids daily. Take your 1 oz medicine cups home with you and continue to use them. Protein intake goal is 60-80 grams per day. Use only protein supplements that have whey protein isolate or soy protein isolate listed as the first ingredient. Eat 4-6 mini-meals a day. Drink 2 protein shakes daily and they count as food, one of the 4-6 meals daily. Each meal should contain 1-2 oz. of pureed food. To puree a food, put it in a  and blend it until there are no chunky pieces remaining. You may need to add a little broth or other moisture to achieve a smooth texture. Food Diary  When you start the pureed/blenderized foods, begin keeping a food diary that includes all of the food and liquids you consume each day. Note how much protein is in each food. Bring your food diary with you to your post-op visits so that we may review your intake.     Refer to handouts from preoperative teaching class for a list of clear liquids and pureed/blenderized foods. If you have any questions about your diet when you get home, please call the office at [phone]. Vitamins & Supplements  Begin taking your chewable multivitamins (Fusion) FOUR per day on Monday after your surgery. We will want to know you are taking the vitamins when you come in for your post op visits. MEDICATIONS:  please review this closely as your medications have changed as a result of your surgery. Unless otherwise noted you will crush all your medications for the first 2 weeks post op and mix them with clears for the first 2 days and then with your pureed food for the remainder of the 2 weeks. I have written for Prilosec (unless you were already on a PPI) which you can open and mix as instructed above and then after two weeks take whole pill. After two weeks you may start taking your pills whole. Please make sure when taking whole pills you do not take them all at once. Take them one at a time and allow a minute or so between each one. Percocet for acute surgical pain. If you were on pain medication before your surgery: Do not take percocet with your other pain prescriptions. Always wean yourself off pain medications as soon as you can to avoid risk of opioid addiction. Prescription has been given for pain medication. Take as needed for pain control. For additional information regarding safe and effective pain control following surgery, please visit facs. org/safepaincontrol  Medications:  1. Zofran - these pills are taken for nausea. - take one pill every 6 hours as needed. 2. Levsin - this is taken for gas pain and cramping.  - dissolve 0.125 mg tablet under your tongue every 4-6 hours as needed.     3. Omeprazole- this medication reduces stomach acid.   -since you had a SLEEVE, take one 40 mg pill, open capsule and sprinkle in sugar free applesauce or water, per day for 6 weeks    4. Oxycodone/Roxicodone/Percocet - this medication is taken for pain. - take 1 tablet every 6 hours as needed-crush medication for the first two weeks      Activity  You are encouraged to walk through the entire postoperative period as this will help with preventing clots and also help with preventing constipation. You are restricted from lifting anything greater than 10 lbs for the first 6 weeks after surgery. You may shower, but should not get into baths, pools or hot tubs until the provider releases you to do so. Constipation  Constipation is a common issue following surgery. This is due to anesthesia, decreased fluids and fiber in your diet, drinking protein shakes and taking pain medication. Make sure you are getting 48-64 ounces of fluids per day and walking. If you develop constipation you may use docusate sodium (Colace) which is an over-the-counter stool softener and can be taken 1-2 times per day and/or you can use Miralax which is an over-the-counter laxative and can be taken on a daily basis. If this does not help please call the office for further instructions. PLEASE CONTACT YOUR PRIMARY CARE PHYSICIAN AND/OR ENDOCRINOLOGIST AT DISCHARGE AND COME UP WITH A PLAN FOR THE MANAGEMENT OF YOUR BLOOD PRESSURE AND BLOOD SUGAR MEDICATIONS. IF YOU DO NOT HAVE A WAY TO CHECK YOUR BLOOD PRESSURE AT HOME I SUGGEST THAT YOU  A BLOOD PRESSURE CUFF AT THE STORE TO MONITOR YOUR BLOOD PRESSURE SO THAT YOUR PRIMARY CARE PHYSICIAN CAN EVALUATE THE NEED TO CHANGE ANY OF YOUR MEDICATIONS.    - No lifting >8lbs or a gallon of milk for 2 weeks. - No driving while on narcotics  Otherwise, you can drive when you can sit comfortably behind the steering wheel and can slam on the brake without it hurting or turn the wheel sharply without it hurting. Practice this when sitting the car with it parked in your driveway before trying to drive.    --use Incentive spirometer (IS-the breathing thing that the hospital gave you) every couple of hours. This will help prevent you from getting pneumonia. You want to do this for the next couple of weeks until you can take deep breaths without it hurting.      - May shower, let water run over incision sites. No soaking in tub, hot tub, or pool. Do not submerge incision site in water.   - Notify MD immediately if experierencing fevers/chills, nausea/vomiting, pus-like discharge from incision sites, redness swelling, or warmth to incisions.       - Follow up with Dr. Peyton Jamil in 2 weeks - call for appointment. If you have any trouble maintaining your fluid intake, please contact the office at [phone] so that we can assess your need for IV fluids and vitamins. If you have a problem/complication please call the office or if it is an emergency, please return to the hospital where your surgery was performed.
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
## 2022-09-07 VITALS
HEART RATE: 87 BPM | OXYGEN SATURATION: 95 % | DIASTOLIC BLOOD PRESSURE: 81 MMHG | TEMPERATURE: 98 F | SYSTOLIC BLOOD PRESSURE: 122 MMHG | BODY MASS INDEX: 45.99 KG/M2 | HEIGHT: 67 IN | RESPIRATION RATE: 17 BRPM | WEIGHT: 293 LBS

## 2022-09-07 LAB
ANION GAP SERPL CALCULATED.3IONS-SCNC: 10 MMOL/L (ref 3–16)
BUN BLDV-MCNC: 6 MG/DL (ref 7–20)
CALCIUM SERPL-MCNC: 8.3 MG/DL (ref 8.3–10.6)
CHLORIDE BLD-SCNC: 107 MMOL/L (ref 99–110)
CO2: 24 MMOL/L (ref 21–32)
CREAT SERPL-MCNC: <0.5 MG/DL (ref 0.6–1.1)
GFR AFRICAN AMERICAN: >60
GFR NON-AFRICAN AMERICAN: >60
GLUCOSE BLD-MCNC: 112 MG/DL (ref 70–99)
HCT VFR BLD CALC: 35 % (ref 36–48)
HEMOGLOBIN: 11.8 G/DL (ref 12–16)
MCH RBC QN AUTO: 27.1 PG (ref 26–34)
MCHC RBC AUTO-ENTMCNC: 33.6 G/DL (ref 31–36)
MCV RBC AUTO: 80.6 FL (ref 80–100)
PDW BLD-RTO: 13 % (ref 12.4–15.4)
PLATELET # BLD: 186 K/UL (ref 135–450)
PMV BLD AUTO: 8.2 FL (ref 5–10.5)
POTASSIUM SERPL-SCNC: 3.6 MMOL/L (ref 3.5–5.1)
RBC # BLD: 4.34 M/UL (ref 4–5.2)
SODIUM BLD-SCNC: 141 MMOL/L (ref 136–145)
WBC # BLD: 11.1 K/UL (ref 4–11)

## 2022-09-07 PROCEDURE — 6370000000 HC RX 637 (ALT 250 FOR IP): Performed by: NURSE PRACTITIONER

## 2022-09-07 PROCEDURE — 6360000002 HC RX W HCPCS: Performed by: SURGERY

## 2022-09-07 PROCEDURE — 2580000003 HC RX 258: Performed by: SURGERY

## 2022-09-07 PROCEDURE — C9113 INJ PANTOPRAZOLE SODIUM, VIA: HCPCS | Performed by: SURGERY

## 2022-09-07 PROCEDURE — 36415 COLL VENOUS BLD VENIPUNCTURE: CPT

## 2022-09-07 PROCEDURE — 2580000003 HC RX 258: Performed by: NURSE PRACTITIONER

## 2022-09-07 PROCEDURE — 85027 COMPLETE CBC AUTOMATED: CPT

## 2022-09-07 PROCEDURE — 6360000002 HC RX W HCPCS: Performed by: NURSE PRACTITIONER

## 2022-09-07 PROCEDURE — 2500000003 HC RX 250 WO HCPCS: Performed by: NURSE PRACTITIONER

## 2022-09-07 PROCEDURE — 80048 BASIC METABOLIC PNL TOTAL CA: CPT

## 2022-09-07 RX ORDER — METHOCARBAMOL 750 MG/1
750 TABLET, FILM COATED ORAL 4 TIMES DAILY
Qty: 40 TABLET | Refills: 0 | Status: SHIPPED | OUTPATIENT
Start: 2022-09-07 | End: 2022-09-17

## 2022-09-07 RX ORDER — ONDANSETRON 4 MG/1
4 TABLET, FILM COATED ORAL EVERY 6 HOURS PRN
Qty: 30 TABLET | Refills: 0 | Status: SHIPPED | OUTPATIENT
Start: 2022-09-07 | End: 2022-09-07 | Stop reason: SDUPTHER

## 2022-09-07 RX ORDER — OMEPRAZOLE 20 MG/1
20 CAPSULE, DELAYED RELEASE ORAL
Qty: 60 CAPSULE | Refills: 0 | Status: SHIPPED | OUTPATIENT
Start: 2022-09-07

## 2022-09-07 RX ORDER — ONDANSETRON 4 MG/1
4 TABLET, FILM COATED ORAL EVERY 6 HOURS PRN
Qty: 30 TABLET | Refills: 1 | Status: SHIPPED | OUTPATIENT
Start: 2022-09-07

## 2022-09-07 RX ORDER — OXYCODONE HCL 5 MG/5 ML
5 SOLUTION, ORAL ORAL EVERY 4 HOURS PRN
Status: DISCONTINUED | OUTPATIENT
Start: 2022-09-07 | End: 2022-09-07 | Stop reason: HOSPADM

## 2022-09-07 RX ORDER — HYOSCYAMINE SULFATE 0.12 MG/1
125 TABLET SUBLINGUAL EVERY 4 HOURS PRN
Qty: 30 EACH | Refills: 0 | Status: SHIPPED | OUTPATIENT
Start: 2022-09-07

## 2022-09-07 RX ORDER — SIMETHICONE 80 MG
80 TABLET,CHEWABLE ORAL EVERY 6 HOURS PRN
Status: DISCONTINUED | OUTPATIENT
Start: 2022-09-07 | End: 2022-09-07 | Stop reason: HOSPADM

## 2022-09-07 RX ORDER — OXYCODONE HYDROCHLORIDE AND ACETAMINOPHEN 5; 325 MG/1; MG/1
1 TABLET ORAL EVERY 6 HOURS PRN
Qty: 28 TABLET | Refills: 0 | Status: SHIPPED | OUTPATIENT
Start: 2022-09-07 | End: 2022-09-14

## 2022-09-07 RX ORDER — OXYCODONE HCL 5 MG/5 ML
10 SOLUTION, ORAL ORAL EVERY 4 HOURS PRN
Status: DISCONTINUED | OUTPATIENT
Start: 2022-09-07 | End: 2022-09-07 | Stop reason: HOSPADM

## 2022-09-07 RX ADMIN — CIPROFLOXACIN 400 MG: 2 INJECTION, SOLUTION INTRAVENOUS at 09:31

## 2022-09-07 RX ADMIN — HYOSCYAMINE SULFATE 250 MCG: 0.5 INJECTION, SOLUTION SUBCUTANEOUS at 07:44

## 2022-09-07 RX ADMIN — METRONIDAZOLE 500 MG: 500 INJECTION, SOLUTION INTRAVENOUS at 01:05

## 2022-09-07 RX ADMIN — ENOXAPARIN SODIUM 40 MG: 100 INJECTION SUBCUTANEOUS at 07:46

## 2022-09-07 RX ADMIN — ONDANSETRON 4 MG: 2 INJECTION INTRAMUSCULAR; INTRAVENOUS at 14:36

## 2022-09-07 RX ADMIN — METHOCARBAMOL 1000 MG: 100 INJECTION, SOLUTION INTRAMUSCULAR; INTRAVENOUS at 07:46

## 2022-09-07 RX ADMIN — SODIUM CHLORIDE, PRESERVATIVE FREE 40 MG: 5 INJECTION INTRAVENOUS at 07:46

## 2022-09-07 RX ADMIN — PROCHLORPERAZINE EDISYLATE 10 MG: 5 INJECTION INTRAMUSCULAR; INTRAVENOUS at 08:08

## 2022-09-07 RX ADMIN — OXYCODONE HYDROCHLORIDE 5 MG: 5 SOLUTION ORAL at 08:08

## 2022-09-07 RX ADMIN — OXYCODONE HYDROCHLORIDE 5 MG: 5 SOLUTION ORAL at 14:36

## 2022-09-07 RX ADMIN — SODIUM CHLORIDE: 9 INJECTION, SOLUTION INTRAVENOUS at 07:49

## 2022-09-07 ASSESSMENT — PAIN DESCRIPTION - DESCRIPTORS
DESCRIPTORS: ACHING
DESCRIPTORS: CRAMPING
DESCRIPTORS: DISCOMFORT

## 2022-09-07 ASSESSMENT — PAIN DESCRIPTION - PAIN TYPE: TYPE: SURGICAL PAIN

## 2022-09-07 ASSESSMENT — PAIN SCALES - GENERAL: PAINLEVEL_OUTOF10: 0

## 2022-09-07 ASSESSMENT — PAIN DESCRIPTION - LOCATION
LOCATION: ABDOMEN

## 2022-09-07 ASSESSMENT — PAIN DESCRIPTION - ONSET: ONSET: GRADUAL

## 2022-09-07 ASSESSMENT — PAIN DESCRIPTION - ORIENTATION
ORIENTATION: MID
ORIENTATION: MID

## 2022-09-07 ASSESSMENT — PAIN DESCRIPTION - FREQUENCY: FREQUENCY: INTERMITTENT

## 2022-09-07 NOTE — PROGRESS NOTES
Surgery Daily Progress Note  Kim Zamudio  CC: Morbid Obesity  Subjective :  No emesis overnight. No nausea this morning. C/o cramping in her chest.   No pain at surgical sites. Has been up OOB and ambulating. Objective    Infusions:   sodium chloride 150 mL/hr at 09/06/22 1127    sodium chloride      HYDROmorphone          I/O:I/O last 3 completed shifts: In: 1904 [I.V.:1904]  Out: 170 [Urine:150; Blood:20]           Wt Readings from Last 1 Encounters:   09/06/22 (!) 302 lb 6.4 oz (137.2 kg)                 Exam:BP (!) 152/79   Pulse 81   Temp 98 °F (36.7 °C) (Oral)   Resp 16   Ht 5' 7\" (1.702 m)   Wt (!) 302 lb 6.4 oz (137.2 kg)   SpO2 96%   BMI 47.36 kg/m²   General appearance: alert, appears stated age and cooperative  Lungs: clear to auscultation bilaterally  Heart: S1, S2  Abdomen: soft, appropriately-tender; bowel sounds quiet  Trocar sites well approx      ASSESSMENT/PLAN: Pt. is a 28 y.o. female s/p Robotic Assisted Laparoscopic Sleeve Gastrectomy POD# 1    Overall, doing well  Will advance according to clinical pathway  Try a dose of levsin this morning for cramping  Start on Bariatric Clears  Pharmacy to review home meds -pills to be crushed for 2 weeks post op    Monitor progress throughout the day. D/C late afternoon as long as able to take adequate po to remain hydrated without IVF, voiding, pain and nausea tolerable with oral meds, using IS frequently and ambulating.     D/W Dr. Guerrero Chen, APRN - CNP 9/7/2022 6:18 AM  [phone]

## 2022-09-07 NOTE — OP NOTE
liver retractor was inserted through a small incision in the upper midline, lifted the liver upward, and was then secured to the OR table. The pylorus was identified and measurement was taken approximately 4 cm from the pylorus along the greater curvature of the stomach. The vessel sealer was used to take down the attachments and short gastric vessels along the greater curve of the stomach. This was continued until all attachments were taken down and continued to the gastro-esophageal junction. A 36 English dilator was advanced along the lesser curvature and into the pylorus. A stapler was fired along the dilator to create an appropriate sized gastric sleeve pouch. Green firing followed by blue firings were used to create the sleeve. The staple line looked very healthy and no bleeding from staple line. The stomach was confirmed to be completely divided with uniform shape,  no twist in the sleeve and wide patent incisura. A 2-0 PDS suture was used to over-sew and imbricate the sleeve staple line. The staple line was completely over-sewn over dilator. The stomach distal to the staple line was clamped and methylene blue saline was injected under pressure confirming no obstruction and no leak. The abdomen was carefully inspected and there was no bleeding or any other abnormality. The stomach was brought out through the RUQ incision. Hemostasis was confirmed. The 12 port sites was closed using 0.0 Vicryl  suture at the level of the fascia. The trocar site skin wounds were closed using 4.0 Vicryl after copious Irrigation of the wounds. Local Anesthetic used at port sites then Dermabond applied. Instrument, sponge, and needle counts were correct at the conclusion of the case. Findings: Morbid Obesity. Estimated Blood Loss:  Minimal           Drains: none           Specimens: Stomach (Subtotal)            Complications:  None; patient tolerated the procedure well. Disposition: PACU - hemodynamically stable. Condition: stable    Attending Attestation: I was present and scrubbed for the entire procedure.

## 2022-09-07 NOTE — CARE COORDINATION
Case Management Assessment            Discharge Note                    Date / Time of Note: 9/7/2022 3:41 PM                  Discharge Note Completed by: Glenny Bynum RN    Patient Name: Rahel Harrell   YOB: 1990  Diagnosis: Morbid obesity ([facility] [E66.01]  Morbid obesity with BMI of 45.0-49.9, adult ([facility] [E66.01, Z68.42]   Date / Time: 9/6/2022  5:29 AM    Current PCP: Fredy Ayon MD  Clinic patient: No    Hospitalization in the last 30 days: No    Advance Directives:  Code Status: Full Code  [place] DNR form completed and on chart: Not Indicated    Financial:  Payor: [facility] / Plan: [address] [place] [address] / Product Type: *No Product type* /      Pharmacy:    [facility] [id number] [place] [phone] - F [phone]  [address] [place] [id number]  Phone: [phone] Fax: [phone]      Assistance purchasing medications?: Potential Assistance Purchasing Medications: No  Assistance provided by Case Management: None at this time    Does patient want to participate in local refill/ meds to beds program?:      Meds To Beds General Rules:  1. Can ONLY be done Monday- Friday between 8:30am-5pm  2. Prescription(s) must be in pharmacy by 3pm to be filled same day  3. Copy of patient's insurance/ prescription drug card and patient face sheet must be sent along with the prescription(s)  4. Cost of Rx cannot be added to hospital bill. If financial assistance is needed, please contact unit  or ;  or  CANNOT provide pharmacy voucher for patients co-pays  5.  Patients can then  the prescription on their way out of the hospital at discharge, or pharmacy can deliver to the bedside if staff is available. (payment due at time of pick-up or delivery - cash, check, or card accepted)     Able to afford home medications/ co-pay costs: Yes    ADLS:  Current PT AM-PAC Score:   /24  Current OT AM-PAC Score: /24      DISCHARGE Disposition: Home- No Services Needed    LOC at discharge: Not Applicable  LORETA Completed: Not Indicated    Notification completed in HENS/PAS?:  Not Applicable    IMM Completed:   Not Indicated    Transportation:  Transportation PLAN for discharge: family   Mode of Transport: [address] ordered at discharge: Not [address]: Not Applicable  Orders faxed: No    Durable Medical Equipment:  DME Provider: none  Equipment obtained during hospitalization:     Home Oxygen and Respiratory Equipment:  Oxygen needed at discharge?: Not [address]: Not Applicable  Portable tank available for discharge?: Not Indicated    Dialysis:  Dialysis patient: No    Dialysis Center:  Not Applicable      Additional CM Notes: Pt from home no needs at DC will follow up OP with surgical team     The Plan for Transition of Care is related to the following treatment goals of Morbid obesity ([facility] [E66.01]  Morbid obesity with BMI of 45.0-49.9, adult ([facility] [E66.01, Z68.42]    The Patient and/or patient representative Santi Liriano and her family were provided with a choice of provider and agrees with the discharge plan Yes    Freedom of choice list was provided with basic dialogue that supports the patient's individualized plan of care/goals and shares the quality data associated with the providers.  Not Indicated    Care Transitions patient: No    Bridger Mike RN  [facility]  Case Management Department  Ph: [phone] Fax: [phone]

## 2022-09-07 NOTE — DISCHARGE SUMMARY
Discharge Progress Note  9/7/2022 4:56 PM    Data:  Discharge order received. Action:  IV D/C'd without difficulty. See Doc Flowsheets for assessment. Patient given discharge instructions with prescriptions with surgical NP. Response:  Patient verbalized understanding of discharge instructions.  Patient left with all belongings to front lobby with family    El Pak RN  ________________________________________________________________________

## 2022-09-07 NOTE — PROGRESS NOTES
Patient alert and oriented x4. VSS  Patient has been OOB and ambulated x1 assist to bathroom and voiding adequately. Pain controlled with PCA pump. Reglan given for nausea. Bed is in the lowest position, call light and bedside table within reach. Patient bed alarm is on. Will continue to monitor.

## 2022-09-09 NOTE — DISCHARGE SUMMARY
Patient ID:  Marta Zamora  [id number]  37 y.o.  1990    Admit date: 9/6/2022    Discharge date and time: 9/7/22    Admitting Physician: Rosie Luis DO     Discharge Physician: same    Admission Diagnoses: Morbid obesity ([facility] [E66.01]  Morbid obesity with BMI of 45.0-49.9, adult ([facility] [E66.01, Z68.42]    Discharge Diagnoses: same    Admission Condition: fair    Discharged Condition: stable    Indication for Admission: Surgery: Robotic assisted Laparoscopic Sleeve Gastrectomy, IV hydration, monitoring, pain and nausea management    Hospital Course: 28 y.o. female admitted with morbid obesity who underwent Robotic assisted laparoscopic sleeve gastrectomy. Surgery was uneventful and she was admitted to bariatric post-operative surgical floor in stable condition for IV hydration, monitoring and pain and nausea management. The following morning the pain was tolerable on po pain medication and was taking adequate po. She was discharged in stable condition. Treatments: IV hydration        Disposition: home    Patient Instructions: Activity: activity as tolerated and no driving while on analgesics  Diet: clear liquids  Wound Care: as directed    Follow-up with Dr. Juan Pierson in two weeks.         Signed:  ABI العلي CNP  9/9/2022  11:15 AM

## 2022-09-12 ENCOUNTER — TELEPHONE (OUTPATIENT)
Dept: BARIATRICS/WEIGHT MGMT | Age: 32
End: 2022-09-12

## 2022-09-12 NOTE — TELEPHONE ENCOUNTER
Surgery Type: sleeve    Surgery Date: 9/6/22    Surgeon: Chris Gutierrez    The patient was contacted to follow up on their recent bariatric surgery. The following topics were reviewed:    [x] Hydration is Adequate            --Patient is getting at least 48-64 oz of fluids a day, not including protein shakes. Drinking water, minute maid zero, diet decaf green tea, propel, powerade zero. [x]Consuming Adequate Protein        [x]Consuming 2 protein shakes a day, sometimes doing a protein 2O. [x]Consuming 60-80 grams of protein a day - tracking in baritastic    [x] Food intake is appropriate   [x]Adequately pureeing foods, so that there are no chunks left. Prepping ahead of time and freezing. [x]Taking in 1-2 oz at a time  - 1 oz 3 times per day  [] Eating 4-6 times a day  [x] Following the 30-30-30 rule - using a timer - sometimes taking 40 min to eat as she waits 10 minutes after her first bite and waits 60 seconds between each bite. Recommend keeping eating occasion to 30 min. [x] Reminded patient to keep food diary to bring to their 2 week follow up appointment. [x] Pain relief techniques utilized - stopped taking all pain medication, she reports feeling so well \"it feels like I did not even have surgery\".    [] Taking pain medication as prescribed  [] Utilizing Lidoderm patches (if prescribed)  [] Taking Tylenol instead of prescription pain medication  [x] Wearing abdominal binder  [x] Using ice for incisional pain  Pt taking medication for muscle spasms but plans to wean off    [x] Activity is appropriate  [x] Walking 10 minutes out of every hour  [x]Avoiding heavy lifting (>10lbs)  [x] Utilizing their incentive spirometer    [] Issues with Nausea/Vomiting/Reflux - no issues, not taking any of the medications  [] Using Zofran PRN for nausea/vomiting   []Taking Prilosec for reflux    [x] Issues with Constipation - struggled with constipation, then took a stool softener the last 2 days, first BM was yesterday and today, feels. [x]Tried Colace  []Tried Miralax    All questions and concerns addressed including pt had questions on protein shakes and sf pudding    Patient was asked to please fill out hospital survey if she receives one in the mail.

## 2022-09-16 ENCOUNTER — PATIENT MESSAGE (OUTPATIENT)
Dept: FAMILY MEDICINE CLINIC | Age: 32
End: 2022-09-16

## 2022-09-16 DIAGNOSIS — F90.0 ATTENTION DEFICIT HYPERACTIVITY DISORDER (ADHD), PREDOMINANTLY INATTENTIVE TYPE: ICD-10-CM

## 2022-09-16 RX ORDER — DEXTROAMPHETAMINE SACCHARATE, AMPHETAMINE ASPARTATE, DEXTROAMPHETAMINE SULFATE AND AMPHETAMINE SULFATE 3.75; 3.75; 3.75; 3.75 MG/1; MG/1; MG/1; MG/1
15 TABLET ORAL 2 TIMES DAILY
Qty: 60 TABLET | Refills: 0 | Status: SHIPPED | OUTPATIENT
Start: 2022-09-16 | End: 2022-10-25 | Stop reason: SDUPTHER

## 2022-09-16 NOTE — TELEPHONE ENCOUNTER
From: Franklyn Ocampo  To: Dr. Coffey Head: 9/16/2022 8:02 AM EDT  Subject: ADHD    Can you please send a refill for my Adderall for the extended release to the pharmacy?

## 2022-09-20 ENCOUNTER — TELEPHONE (OUTPATIENT)
Dept: BARIATRICS/WEIGHT MGMT | Age: 32
End: 2022-09-20

## 2022-09-20 NOTE — TELEPHONE ENCOUNTER
Called pt to discuss concerns. Pt states she feels constipated, so full like everything is backing up. Has tried stool softeners 2x/day over the past 2 days. Drinking 50-60oz of fluid. Struggling with protein shakes d/t fullness feeling. Did have a small liquid BM today, but no relief. Walking daily & doing bicycle motion with legs for movement. Pt has tried pureed refried beans and applesauce. Discussed it is not abnormal to feel constipated post-operatively. Pt voiced understanding to plan below.      Plan:  Try miralax today  Aim for 64oz of fluid: try warm water &/or broth (reinforced sipping techniques)  Focus on 1oz of pureed protein w/ 1oz of pureed fruit or vegetables   2 week post-op appointment is tomorrow

## 2022-09-21 ENCOUNTER — OFFICE VISIT (OUTPATIENT)
Dept: BARIATRICS/WEIGHT MGMT | Age: 32
End: 2022-09-21

## 2022-09-21 VITALS
WEIGHT: 286 LBS | SYSTOLIC BLOOD PRESSURE: 120 MMHG | DIASTOLIC BLOOD PRESSURE: 87 MMHG | BODY MASS INDEX: 44.89 KG/M2 | HEIGHT: 67 IN

## 2022-09-21 DIAGNOSIS — E66.01 MORBID OBESITY WITH BMI OF 45.0-49.9, ADULT (HCC): Primary | ICD-10-CM

## 2022-09-21 PROCEDURE — 99024 POSTOP FOLLOW-UP VISIT: CPT | Performed by: SURGERY

## 2022-09-21 NOTE — PATIENT INSTRUCTIONS
Patient received dietary handouts and education. Goals:   continue with plan  miralax as needed  consider decreasing cows milk products and sub protein shake with unsweet almond milk. may switch to fusion capsule with iron, then at 6 weeks may begin fusion calcium chew twice per day.      Plan: advance to phase 3 at 3 weeks post op as tolerated

## 2022-09-21 NOTE — PROGRESS NOTES
Dietary Assessment Note      Vitals:   Vitals:    22 1138   Weight: 286 lb (129.7 kg)   Height: 5' 7\" (1.702 m)    Patient lost 26 lbs over since preop. Total Weight Loss: 38 lbs    Labs reviewed: labs are reviewed, up to date and normal, no lab studies available for review at time of visit    Protein intake: 60-80 grams/day     Fluid intake:   Pt is drinking at least 48oz     Multivitamin/mineral intake: struggling with fusion chewables, still taking them     Calcium intake: no    Other: no    Exercise: yes daily walking     Nutrition Assessment: 2 week post-op visit. Tracking on [employer] chastity. She has been eating 2-3 times per day. She is having 2 protein shakes mixed with milk per day. Also having triple zero yogurt, ricotta bake, cottage cheese, egg. She is measuring and prepping portions on sundays for the week  Amount able to eat per sittin-2 oz     Following  rule: yes - sets timers    Food Intolerances/issues: none    Client Concerns: constipation, low appetite. However took miralax yesterday and has had 2 BMs today. May consider switching to fusion capsule then adding in calcium chew     Goals: continue with plan  miralax as needed  consider decreasing cows milk products and sub protein shake with unsweet almond milk. Pt may switch to fusion capsule with iron, then at 6 weeks begin fusion calcium chew twice per day.      Plan: advance to phase 3 at 3 weeks post op as tolerated     Austin Griffiths, MS, RD, LD

## 2022-09-22 DIAGNOSIS — F32.A ANXIETY AND DEPRESSION: ICD-10-CM

## 2022-09-22 DIAGNOSIS — F41.9 ANXIETY AND DEPRESSION: ICD-10-CM

## 2022-09-22 RX ORDER — SERTRALINE HYDROCHLORIDE 100 MG/1
TABLET, FILM COATED ORAL
Qty: 90 TABLET | Refills: 1 | Status: SHIPPED | OUTPATIENT
Start: 2022-09-22

## 2022-09-22 NOTE — TELEPHONE ENCOUNTER
Medication:   Requested Prescriptions     Pending Prescriptions Disp Refills    sertraline (ZOLOFT) 100 MG tablet [Pharmacy Med Name: Sertraline HCl Oral Tablet 100 MG] 90 tablet 0     Sig: TAKE 1 TABLET BY MOUTH EVERY DAY        Last Filled:  3/9/2022    Patient Phone Number: [phone] (home)     Last appt: 8/22/2022   Next appt: Visit date not found    Last OARRS:   RX Monitoring 9/16/2022   Attestation -   Periodic Controlled Substance Monitoring No signs of potential drug abuse or diversion identified.

## 2022-10-04 NOTE — PROGRESS NOTES
Patient doing well  No N/V/F/C  Tolerating fluids and protein  On phase 2   Educated on phase 3    Incisions C/D/I    S/P sleeve gastrectomy     F/U in 4 weeks    Dc Gillis

## 2022-10-06 ENCOUNTER — E-VISIT (OUTPATIENT)
Dept: FAMILY MEDICINE CLINIC | Age: 32
End: 2022-10-06
Payer: COMMERCIAL

## 2022-10-06 PROCEDURE — 99421 OL DIG E/M SVC 5-10 MIN: CPT | Performed by: FAMILY MEDICINE

## 2022-10-06 RX ORDER — ONDANSETRON 4 MG/1
4 TABLET, ORALLY DISINTEGRATING ORAL EVERY 8 HOURS PRN
Qty: 15 TABLET | Refills: 0 | Status: SHIPPED | OUTPATIENT
Start: 2022-10-06

## 2022-10-06 RX ORDER — PROCHLORPERAZINE MALEATE 5 MG/1
5 TABLET ORAL EVERY 6 HOURS PRN
Qty: 120 TABLET | Refills: 1 | Status: SHIPPED | OUTPATIENT
Start: 2022-10-06

## 2022-10-06 ASSESSMENT — LIFESTYLE VARIABLES
SMOKING_YEARS: 2
SMOKING_STATUS: NO, BUT I USED TO SMOKE
PACKS_PER_DAY: .5

## 2022-10-06 NOTE — PROGRESS NOTES
Possible gastroenteritis    No sig belly pain  No fever or diarrhea     Push Clear liquids  Zofran for nausea  To emergency room if not improving or cannot keep fluids down No

## 2022-10-14 ENCOUNTER — TELEPHONE (OUTPATIENT)
Dept: BARIATRICS/WEIGHT MGMT | Age: 32
End: 2022-10-14

## 2022-10-14 NOTE — TELEPHONE ENCOUNTER
Called and lvm with pt to check on her. S/p sleeve 9/6/22. Was having some vomiting and not feeling well. Possible flu.

## 2022-10-19 ENCOUNTER — OFFICE VISIT (OUTPATIENT)
Dept: BARIATRICS/WEIGHT MGMT | Age: 32
End: 2022-10-19

## 2022-10-19 VITALS — WEIGHT: 275 LBS | BODY MASS INDEX: 43.16 KG/M2 | HEIGHT: 67 IN

## 2022-10-19 DIAGNOSIS — Z98.84 STATUS POST LAPAROSCOPIC SLEEVE GASTRECTOMY: Primary | ICD-10-CM

## 2022-10-19 PROCEDURE — 99024 POSTOP FOLLOW-UP VISIT: CPT | Performed by: SURGERY
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
## 2022-10-19 NOTE — PROGRESS NOTES
Dietary Assessment Note    Vitals:   Vitals:    10/19/22 0700   Weight: 275 lb (124.7 kg)   Height: 5' 7\" (1.702 m)    Patient lost 11 lbs over 1 month. Total Weight Loss: 49#    Labs reviewed: no lab studies available for review at time of visit    Protein intake: 60-80 grams/day     Fluid intake: 48-64 oz/day    Multivitamin/mineral intake:  Fusion: 1 capsule, going to start calcium soft chews    Calcium intake: None    Other: None    Exercise: Yes. How much: Walking around [facility] with her . Pt is excited to get cleared for PA so she can start hiking again. Nutrition Assessment: 6 week post-op visit. Pt feels her energy is improved. Pt using miralaz and colace.     Protein shakes daily: 1 in am    Breakfast: protein shake    Snack: scrambled egg OR cottage cheese    Lunch: leftovers from dinner    Snack: apples and PB (LF and low sodium)    Dinner: lasagna with zucchini and LF ricotta OR turkey chili OR protein and veggie    Snack: None    Frequency: 4-5x/day including shake  30-30-30: following  Amount at a time: 2-4 oz, depending on food    Food Intolerances/issues:  sweet potato    Client Concerns: None    Goals:   Start phase 4 and calcium soft chews    Plan: F/u at 14 weeks post op      Rock Giselle RD, LD

## 2022-10-25 DIAGNOSIS — F90.0 ATTENTION DEFICIT HYPERACTIVITY DISORDER (ADHD), PREDOMINANTLY INATTENTIVE TYPE: ICD-10-CM

## 2022-10-25 DIAGNOSIS — F90.0 ATTENTION DEFICIT HYPERACTIVITY DISORDER (ADHD), PREDOMINANTLY INATTENTIVE TYPE: Primary | ICD-10-CM

## 2022-10-25 RX ORDER — DEXTROAMPHETAMINE SACCHARATE, AMPHETAMINE ASPARTATE, DEXTROAMPHETAMINE SULFATE AND AMPHETAMINE SULFATE 3.75; 3.75; 3.75; 3.75 MG/1; MG/1; MG/1; MG/1
15 TABLET ORAL 2 TIMES DAILY
Qty: 60 TABLET | Refills: 0 | Status: SHIPPED | OUTPATIENT
Start: 2022-10-25 | End: 2022-10-25 | Stop reason: ALTCHOICE

## 2022-10-25 RX ORDER — DEXTROAMPHETAMINE SACCHARATE, AMPHETAMINE ASPARTATE MONOHYDRATE, DEXTROAMPHETAMINE SULFATE AND AMPHETAMINE SULFATE 5; 5; 5; 5 MG/1; MG/1; MG/1; MG/1
20 CAPSULE, EXTENDED RELEASE ORAL EVERY MORNING
Qty: 30 CAPSULE | Refills: 0 | Status: SHIPPED | OUTPATIENT
Start: 2022-10-25 | End: 2022-11-23 | Stop reason: DRUGHIGH

## 2022-11-23 ENCOUNTER — PATIENT MESSAGE (OUTPATIENT)
Dept: FAMILY MEDICINE CLINIC | Age: 32
End: 2022-11-23

## 2022-11-23 DIAGNOSIS — F90.0 ATTENTION DEFICIT HYPERACTIVITY DISORDER (ADHD), PREDOMINANTLY INATTENTIVE TYPE: Primary | ICD-10-CM

## 2022-11-23 RX ORDER — DEXTROAMPHETAMINE SACCHARATE, AMPHETAMINE ASPARTATE MONOHYDRATE, DEXTROAMPHETAMINE SULFATE AND AMPHETAMINE SULFATE 7.5; 7.5; 7.5; 7.5 MG/1; MG/1; MG/1; MG/1
30 CAPSULE, EXTENDED RELEASE ORAL DAILY
Qty: 30 CAPSULE | Refills: 0 | Status: SHIPPED | OUTPATIENT
Start: 2022-11-23 | End: 2022-12-23

## 2022-11-23 NOTE — TELEPHONE ENCOUNTER
From: Julieth Bates  To: Dr. Pelaez Mins: 11/23/2022 8:30 AM EST  Subject: ADHD Medicine    Can you please send a refill of my ADHD medicine to the pharmacy? Also can we please increase the dose back to 30mg instead of 20mg?

## 2022-12-14 ENCOUNTER — OFFICE VISIT (OUTPATIENT)
Dept: BARIATRICS/WEIGHT MGMT | Age: 32
End: 2022-12-14
Payer: COMMERCIAL

## 2022-12-14 VITALS — BODY MASS INDEX: 41.44 KG/M2 | HEIGHT: 67 IN | WEIGHT: 264 LBS

## 2022-12-14 DIAGNOSIS — E66.01 MORBID OBESITY WITH BMI OF 40.0-44.9, ADULT (HCC): Primary | ICD-10-CM

## 2022-12-14 DIAGNOSIS — Z98.84 STATUS POST LAPAROSCOPIC SLEEVE GASTRECTOMY: ICD-10-CM

## 2022-12-14 DIAGNOSIS — K21.9 GASTROESOPHAGEAL REFLUX DISEASE, UNSPECIFIED WHETHER ESOPHAGITIS PRESENT: ICD-10-CM

## 2022-12-14 PROCEDURE — 99213 OFFICE O/P EST LOW 20 MIN: CPT | Performed by: SURGERY

## 2022-12-14 NOTE — PROGRESS NOTES
Dietary Assessment Note    Vitals:   Vitals:    12/14/22 0724   Weight: 264 lb (119.7 kg)   Height: 5' 7\" (1.702 m)    Patient lost 11 lbs over 2 months. Total Weight Loss: 60#    Labs reviewed: no lab studies available for review at time of visit    Protein intake: 60-80 grams/day     Fluid intake: 48-64 oz/day    Multivitamin/mineral intake:  Fusion - how many/day: 1 capsule    Calcium intake: Calcium soft chew    Other: Biotin    Exercise: Yes. How much: Walking (speed walking and jogging) + cardio machines. What kind: 30-40 minutes 3x/week    Nutrition Assessment: 14 week post-op visit. Food schedule - using [employer] chastity to track. Calories 800-1200 (usually around 1000)    Breakfast: scrambled egg OR yogurt w/ protein granola    Snack: NV protein bar OR CC w/ tomato OR PB and and celery/apple    Lunch: egg life wrap with veggie sometimes with turkey    Snack: NV protein bar OR CC w/ tomato OR PB and and celery/apple    Dinner: sheet pan sweet potatoes and veggie + chicken/salmon    Snack: None     30-30-30: Following  Amount at a time: 3/4 cup (4-6 oz) + broccoli/green beans    Food Intolerances/issues:  light [place]    Client Concerns: 5 lb regain after starting to exercise, feels stressed about sticking strictly to her food plan. Goals:   Reduce to 3 oz protein + veg side (goal of around 1 cup total per meal)  Less hard on herself - use plan as a guideline but avoid being overly strict and controlling. Think more long-term wt loss and overall healthy habits.     Plan: F/u at 6 months      Tyrese Giels RD, LD
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
## 2022-12-22 ENCOUNTER — PATIENT MESSAGE (OUTPATIENT)
Dept: FAMILY MEDICINE CLINIC | Age: 32
End: 2022-12-22

## 2022-12-22 DIAGNOSIS — F90.0 ATTENTION DEFICIT HYPERACTIVITY DISORDER (ADHD), PREDOMINANTLY INATTENTIVE TYPE: ICD-10-CM

## 2022-12-22 RX ORDER — DEXTROAMPHETAMINE SACCHARATE, AMPHETAMINE ASPARTATE MONOHYDRATE, DEXTROAMPHETAMINE SULFATE AND AMPHETAMINE SULFATE 7.5; 7.5; 7.5; 7.5 MG/1; MG/1; MG/1; MG/1
30 CAPSULE, EXTENDED RELEASE ORAL DAILY
Qty: 30 CAPSULE | Refills: 0 | Status: SHIPPED | OUTPATIENT
Start: 2022-12-22 | End: 2023-01-21

## 2022-12-22 NOTE — TELEPHONE ENCOUNTER
From: Julieth Bates  To: Dr. Pelaez Mins: 12/22/2022 10:38 AM EST  Subject: ADHD Medicine    Hi, can you please send in a refill for my generic extended release 30mg Adderall?

## 2023-01-04 ENCOUNTER — APPOINTMENT (OUTPATIENT)
Dept: GENERAL RADIOLOGY | Age: 33
End: 2023-01-04
Payer: COMMERCIAL

## 2023-01-04 ENCOUNTER — HOSPITAL ENCOUNTER (EMERGENCY)
Age: 33
Discharge: HOME | End: 2023-01-04
Attending: EMERGENCY MEDICINE
Payer: COMMERCIAL

## 2023-01-04 VITALS
OXYGEN SATURATION: 97 % | HEIGHT: 67 IN | SYSTOLIC BLOOD PRESSURE: 146 MMHG | WEIGHT: 257.25 LBS | DIASTOLIC BLOOD PRESSURE: 90 MMHG | HEART RATE: 94 BPM | BODY MASS INDEX: 40.38 KG/M2 | TEMPERATURE: 98.3 F | RESPIRATION RATE: 18 BRPM

## 2023-01-04 DIAGNOSIS — S99.922A INJURY OF LEFT TOE, INITIAL ENCOUNTER: Primary | ICD-10-CM

## 2023-01-04 PROCEDURE — 73630 X-RAY EXAM OF FOOT: CPT

## 2023-01-04 PROCEDURE — 99283 EMERGENCY DEPT VISIT LOW MDM: CPT

## 2023-01-04 RX ORDER — IBUPROFEN 600 MG/1
600 TABLET ORAL EVERY 6 HOURS PRN
Qty: 40 TABLET | Refills: 0 | Status: SHIPPED | OUTPATIENT
Start: 2023-01-04

## 2023-01-04 ASSESSMENT — PAIN DESCRIPTION - LOCATION: LOCATION: FOOT;TOE (COMMENT WHICH ONE)

## 2023-01-04 ASSESSMENT — PAIN DESCRIPTION - ORIENTATION: ORIENTATION: LEFT

## 2023-01-04 ASSESSMENT — PAIN DESCRIPTION - PAIN TYPE: TYPE: ACUTE PAIN

## 2023-01-04 ASSESSMENT — PAIN SCALES - GENERAL: PAINLEVEL_OUTOF10: 8

## 2023-01-04 ASSESSMENT — PAIN - FUNCTIONAL ASSESSMENT
PAIN_FUNCTIONAL_ASSESSMENT: NONE - DENIES PAIN
PAIN_FUNCTIONAL_ASSESSMENT: 0-10

## 2023-01-04 ASSESSMENT — PAIN DESCRIPTION - FREQUENCY: FREQUENCY: CONTINUOUS

## 2023-01-04 ASSESSMENT — PAIN DESCRIPTION - DESCRIPTORS: DESCRIPTORS: ACHING

## 2023-01-04 NOTE — LETTER
[facility]  [address] [address]  Phone: [phone]  Fax: [phone]               January 4, 2023    Patient: Chris Perez   YOB: 1990   Date of Visit: 1/4/2023       To Whom It May Concern:    Lakshmi Marquez was seen and treated in our emergency department on 1/4/2023. She may return to work 1/6/23.       Sincerely,               Signature:__________________________________

## 2023-01-04 NOTE — ED NOTES
Patient given prescription, work note, discharge instructions verbal and written, patient verbalized understanding. Alert/oriented X4, Clear speech.   Patient exhibits no distress, ambulates with steady gait per self leaving unit, no further request.      Milagro Vegas RN  01/04/23 2881
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
## 2023-01-05 NOTE — ED PROVIDER NOTES
EMERGENCY DEPARTMENT ENCOUNTER     [address]     Pt Name: Myesha Kraft   MRN: [id number]   [place] 1990   Date of evaluation: 1/4/2023   Provider: Cecile Rodriguez DO   PCP: Sneha Bains MD   Note Started: 11:10 AM EST 1/5/23     CHIEF COMPLAINT     Chief Complaint   Patient presents with    Foot Injury     Left small and 4th toe        HISTORY OF PRESENT ILLNESS:  History from : Patient   Limitations to history : None     Myesha Kraft is a 28 y.o. female who presents to the emergency department complaining of left fourth toe injury. Patient reports that she was walking through her house when she struck her left fourth toe on furniture. Patient reports that pain was progressive throughout the day and is now rated as 8/10. No additional injuries noted. Nursing Notes were all reviewed and agreed with or any disagreements were addressed in the HPI.     ROS: Positives and Pertinent negatives as per HPI. PAST MEDICAL HISTORY     PHYSICAL EXAM:  ED Triage Vitals [01/04/23 1641]   BP Temp Temp Source Heart Rate Resp SpO2 Height Weight   (!) 146/90 98.3 °F (36.8 °C) Oral 94 18 97 % 5' 7\" (1.702 m) 257 lb 4 oz (116.7 kg)        Physical Exam   Left lower extremity: Hip, knee, and ankle within normal limits. Patient has tenderness noted at the base of the left fourth toe. Ecchymosis noted. Range of motion limited secondary to pain. DIAGNOSTIC RESULTS   LABS:   Labs Reviewed - No data to display   When ordered only abnormal lab results are displayed. All other labs were within normal range or not returned as of this dictation.         RADIOLOGY:    Non-plain film images such as CT, Ultrasound and MRI are read by the radiologist. Plain radiographic images are visualized and preliminarily interpreted by the ED Provider with the below findings:      Interpretation per the Radiologist below, if available at the time of this note:  XR FOOT LEFT (MIN 3 VIEWS)    Result Date: 1/4/2023  3 views of the left foot HISTORY: Pain. Fourth toe injury. FINDINGS: No acute fracture. Alignment is maintained. Soft tissue structures unremarkable. No acute findings. EMERGENCY DEPARTMENT COURSE and DIFFERENTIAL DIAGNOSIS/MDM:     Vitals:    Vitals:    01/04/23 1641   BP: (!) 146/90   Pulse: 94   Resp: 18   Temp: 98.3 °F (36.8 °C)   TempSrc: Oral   SpO2: 97%   Weight: 257 lb 4 oz (116.7 kg)   Height: 5' 7\" (1.702 m)        Patient was given the following medications:   Medications - No data to display          CC/HPI Summary, DDx, ED Course, and Reassessment:      Patient presents to the emergency department with left fourth toe pain post trauma. No additional injury. X-ray was obtained and no fractures were noted. Postop shoe provided. CONSULTS: None   Social Determinants: None   Chronic Conditions: NA    Disposition Considerations: None      I am the primary physician of Record. FINAL IMPRESSION    1. Injury of left toe, initial encounter         DISPOSITION/PLAN   DISPOSITION Decision To Discharge 01/04/2023 06:05:50 PM       PATIENT REFERRED TO:   Nghia Verdugo MD  [address] [place] [address] RD.   [address] [facility] [address]  [phone]    In 3 days      [place] [address] [address] [place] [address]  [phone]    If symptoms worsen     DISCHARGE MEDICATIONS:   Discharge Medication List as of 1/4/2023  6:10 PM        START taking these medications    Details   ibuprofen (IBU) 600 MG tablet Take 1 tablet by mouth every 6 hours as needed for Pain, Disp-40 tablet, R-0Normal            DISCONTINUED MEDICATIONS:   Discharge Medication List as of 1/4/2023  6:10 PM               (Please note that portions of this note were completed with a voice recognition program.  Efforts were made to edit the dictations but occasionally words are mis-transcribed.)     Faye Doll DO (electronically signed)         Faye Doll DO  01/05/23 2938

## 2023-01-16 ENCOUNTER — HOSPITAL ENCOUNTER (EMERGENCY)
Age: 33
Discharge: HOME | End: 2023-01-16
Attending: EMERGENCY MEDICINE
Payer: COMMERCIAL

## 2023-01-16 VITALS
WEIGHT: 253.31 LBS | DIASTOLIC BLOOD PRESSURE: 68 MMHG | OXYGEN SATURATION: 99 % | SYSTOLIC BLOOD PRESSURE: 141 MMHG | HEART RATE: 67 BPM | TEMPERATURE: 98.2 F | RESPIRATION RATE: 16 BRPM | HEIGHT: 67 IN | BODY MASS INDEX: 39.76 KG/M2

## 2023-01-16 DIAGNOSIS — S61.011A LACERATION OF RIGHT THUMB WITHOUT FOREIGN BODY WITHOUT DAMAGE TO NAIL, INITIAL ENCOUNTER: Primary | ICD-10-CM

## 2023-01-16 PROCEDURE — 90715 TDAP VACCINE 7 YRS/> IM: CPT | Performed by: EMERGENCY MEDICINE

## 2023-01-16 PROCEDURE — 6360000002 HC RX W HCPCS: Performed by: EMERGENCY MEDICINE

## 2023-01-16 PROCEDURE — 12001 RPR S/N/AX/GEN/TRNK 2.5CM/<: CPT

## 2023-01-16 PROCEDURE — 90471 IMMUNIZATION ADMIN: CPT | Performed by: EMERGENCY MEDICINE

## 2023-01-16 PROCEDURE — 99284 EMERGENCY DEPT VISIT MOD MDM: CPT

## 2023-01-16 RX ADMIN — TETANUS TOXOID, REDUCED DIPHTHERIA TOXOID AND ACELLULAR PERTUSSIS VACCINE, ADSORBED 0.5 ML: 5; 2.5; 8; 8; 2.5 SUSPENSION INTRAMUSCULAR at 15:50

## 2023-01-16 ASSESSMENT — PAIN - FUNCTIONAL ASSESSMENT
PAIN_FUNCTIONAL_ASSESSMENT: NONE - DENIES PAIN
PAIN_FUNCTIONAL_ASSESSMENT: NONE - DENIES PAIN

## 2023-01-16 NOTE — Clinical Note
Shantel King was seen and treated in our emergency department on 1/16/2023. She may return to work on 01/17/2023. If you have any questions or concerns, please don't hesitate to call.       Mauricio Gan MD
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
## 2023-01-16 NOTE — ED NOTES
Patient given prescription, discharge instructions verbal and written, patient verbalized understanding. Alert/oriented X4, Clear speech.   Patient exhibits no distress, ambulates with steady gait per self leaving unit, no further request.      Stan Robles RN  01/16/23 7881

## 2023-01-25 ENCOUNTER — TELEMEDICINE (OUTPATIENT)
Dept: PRIMARY CARE CLINIC | Age: 33
End: 2023-01-25
Payer: COMMERCIAL

## 2023-01-25 DIAGNOSIS — J06.9 VIRAL UPPER RESPIRATORY TRACT INFECTION: Primary | ICD-10-CM

## 2023-01-25 DIAGNOSIS — R68.89 FLU-LIKE SYMPTOMS: ICD-10-CM

## 2023-01-25 PROCEDURE — 99213 OFFICE O/P EST LOW 20 MIN: CPT | Performed by: NURSE PRACTITIONER

## 2023-01-25 ASSESSMENT — ENCOUNTER SYMPTOMS
WHEEZING: 0
SHORTNESS OF BREATH: 0
SINUS PRESSURE: 1
GASTROINTESTINAL NEGATIVE: 1
SORE THROAT: 1
COUGH: 1
RHINORRHEA: 1
CHEST TIGHTNESS: 0

## 2023-01-25 NOTE — Clinical Note
Hi It is Kylah Suazo from the [facility]. I am having patient call to be swabbed for FLU. Please help her schedule ASAP.  Thanks, Kylah Suazo

## 2023-01-25 NOTE — LETTER
I had the pleasure of seeing Aroldo Virgilio today for a primary care Virtualist video visit. Our team would love your overall feedback on this visit. Please hit shift and click the following link to let us know if the Virtualist service met your expectations. [facility]Yeah[facility]. [email or web address]      Electronically signed by ABI Salinas CNP on 1/25/23 at 4:19 PM EST.
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
## 2023-01-25 NOTE — PATIENT INSTRUCTIONS
Notify office if you do not improve or have worsening of condition. Will test for flu sent to PCP office for swab. If flu is positive will prescribe Tamiflu. Recommend treating symptoms with OTC medications: Flonase for congestions, Mucinex for Phlegm, Robitussin DM or Delsym for cough, Tylenol/Ibuprofen for fever/body aches. Flonase 2 sprays each nostril daily x 5 days and then go to 1 spray each nostril daily. Mucinex plain 600 mg extended release one tablet two times daily (AM and at bedtime)  Take medication as prescribed. May use Cepacol lozenges, or chloraseptic spray. Most consistent with viral illness and antibiotics will not help at this time, however, continue to monitor if no improvement or worsening could develop in to bacterial infection and can be treated with antibiotics. Drink plenty of fluids and rest.  Practice good hand hygiene. May sleep with humidifier as needed. Gargle with warm salt water 3-4 times a day to help with sore throat. Warm tea with honey. You can use ice, warm chicken noodle soup, soft foods, popsicles and cough drops to help soothe your throat. Do not eat or drink after anyone. Do not share utensils. Cover your mouth and nose when you cough or sneeze. Follow up with PCP in 3-4 days if not improving or sooner if worsening.   Please refer to educational handout with AVS.

## 2023-01-30 ENCOUNTER — E-VISIT (OUTPATIENT)
Dept: FAMILY MEDICINE CLINIC | Age: 33
End: 2023-01-30
Payer: COMMERCIAL

## 2023-01-30 DIAGNOSIS — F90.0 ATTENTION DEFICIT HYPERACTIVITY DISORDER (ADHD), PREDOMINANTLY INATTENTIVE TYPE: ICD-10-CM

## 2023-01-30 DIAGNOSIS — J06.9 PROTRACTED URI: Primary | ICD-10-CM

## 2023-01-30 DIAGNOSIS — R09.82 POSTNASAL DRIP: ICD-10-CM

## 2023-01-30 PROCEDURE — 99422 OL DIG E/M SVC 11-20 MIN: CPT | Performed by: FAMILY MEDICINE

## 2023-01-30 RX ORDER — LORATADINE 10 MG/1
10 TABLET ORAL DAILY
Qty: 30 TABLET | Refills: 0 | Status: SHIPPED | OUTPATIENT
Start: 2023-01-30

## 2023-01-30 RX ORDER — CLINDAMYCIN HYDROCHLORIDE 300 MG/1
300 CAPSULE ORAL 2 TIMES DAILY
Qty: 14 CAPSULE | Refills: 0 | Status: SHIPPED | OUTPATIENT
Start: 2023-01-30 | End: 2023-02-06

## 2023-01-30 RX ORDER — DEXTROAMPHETAMINE SACCHARATE, AMPHETAMINE ASPARTATE MONOHYDRATE, DEXTROAMPHETAMINE SULFATE AND AMPHETAMINE SULFATE 7.5; 7.5; 7.5; 7.5 MG/1; MG/1; MG/1; MG/1
30 CAPSULE, EXTENDED RELEASE ORAL DAILY
Qty: 30 CAPSULE | Refills: 0 | Status: SHIPPED | OUTPATIENT
Start: 2023-01-30 | End: 2023-03-01

## 2023-01-30 RX ORDER — FLUTICASONE PROPIONATE 50 MCG
2 SPRAY, SUSPENSION (ML) NASAL DAILY
Qty: 16 G | Refills: 0 | Status: SHIPPED | OUTPATIENT
Start: 2023-01-30

## 2023-01-30 ASSESSMENT — LIFESTYLE VARIABLES
PACKS_PER_DAY: .5
SMOKING_YEARS: 2
SMOKING_STATUS: NO, I'M A FORMER SMOKER

## 2023-01-30 NOTE — TELEPHONE ENCOUNTER
Medication:   Requested Prescriptions     Pending Prescriptions Disp Refills    amphetamine-dextroamphetamine (ADDERALL XR) 30 MG extended release capsule 30 capsule 0     Sig: Take 1 capsule by mouth daily for 30 days. Last Filled:  12/22/2022    Patient Phone Number: [phone] (home)     Last appt: 10/6/2022   Next appt: Visit date not found    Last OARRS:   RX Monitoring 12/22/2022   Attestation -   Periodic Controlled Substance Monitoring No signs of potential drug abuse or diversion identified.

## 2023-01-30 NOTE — PROGRESS NOTES
Gladys Pollen for 1 week     Using Mucinex and Sudafed with minimal relief    Likely sinus infection with nasal discharge    Flonase and Claritin  Allergies to cephalexin and penicillin    Will use clindamycin twice daily    Follow-up as needed for routine care      Recommend updated COVID booster when feeling well enough  Be Sure to follow-up with GYN also
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
## 2023-03-03 ENCOUNTER — OFFICE VISIT (OUTPATIENT)
Dept: FAMILY MEDICINE CLINIC | Age: 33
End: 2023-03-03
Payer: COMMERCIAL

## 2023-03-03 VITALS
HEART RATE: 92 BPM | WEIGHT: 247 LBS | SYSTOLIC BLOOD PRESSURE: 123 MMHG | DIASTOLIC BLOOD PRESSURE: 83 MMHG | HEIGHT: 67 IN | BODY MASS INDEX: 38.77 KG/M2 | OXYGEN SATURATION: 98 % | RESPIRATION RATE: 16 BRPM

## 2023-03-03 DIAGNOSIS — E78.2 MIXED HYPERLIPIDEMIA: ICD-10-CM

## 2023-03-03 DIAGNOSIS — Z30.09 FAMILY PLANNING ADVICE: ICD-10-CM

## 2023-03-03 DIAGNOSIS — F32.A ANXIETY AND DEPRESSION: ICD-10-CM

## 2023-03-03 DIAGNOSIS — F41.9 ANXIETY AND DEPRESSION: ICD-10-CM

## 2023-03-03 DIAGNOSIS — Z81.8 FAMILY HISTORY OF BIPOLAR DISORDER: ICD-10-CM

## 2023-03-03 DIAGNOSIS — F90.0 ATTENTION DEFICIT HYPERACTIVITY DISORDER (ADHD), PREDOMINANTLY INATTENTIVE TYPE: Primary | ICD-10-CM

## 2023-03-03 PROCEDURE — 99214 OFFICE O/P EST MOD 30 MIN: CPT | Performed by: FAMILY MEDICINE

## 2023-03-03 RX ORDER — DEXTROAMPHETAMINE SACCHARATE, AMPHETAMINE ASPARTATE, DEXTROAMPHETAMINE SULFATE AND AMPHETAMINE SULFATE 5; 5; 5; 5 MG/1; MG/1; MG/1; MG/1
20 TABLET ORAL 2 TIMES DAILY
Qty: 60 TABLET | Refills: 0 | Status: SHIPPED | OUTPATIENT
Start: 2023-03-03 | End: 2023-04-02

## 2023-03-03 SDOH — ECONOMIC STABILITY: FOOD INSECURITY: WITHIN THE PAST 12 MONTHS, THE FOOD YOU BOUGHT JUST DIDN'T LAST AND YOU DIDN'T HAVE MONEY TO GET MORE.: NEVER TRUE

## 2023-03-03 SDOH — ECONOMIC STABILITY: HOUSING INSECURITY
IN THE LAST 12 MONTHS, WAS THERE A TIME WHEN YOU DID NOT HAVE A STEADY PLACE TO SLEEP OR SLEPT IN A SHELTER (INCLUDING NOW)?: NO

## 2023-03-03 SDOH — ECONOMIC STABILITY: FOOD INSECURITY: WITHIN THE PAST 12 MONTHS, YOU WORRIED THAT YOUR FOOD WOULD RUN OUT BEFORE YOU GOT MONEY TO BUY MORE.: NEVER TRUE

## 2023-03-03 SDOH — ECONOMIC STABILITY: INCOME INSECURITY: HOW HARD IS IT FOR YOU TO PAY FOR THE VERY BASICS LIKE FOOD, HOUSING, MEDICAL CARE, AND HEATING?: NOT HARD AT ALL

## 2023-03-03 ASSESSMENT — PATIENT HEALTH QUESTIONNAIRE - PHQ9
SUM OF ALL RESPONSES TO PHQ QUESTIONS 1-9: 3
8. MOVING OR SPEAKING SO SLOWLY THAT OTHER PEOPLE COULD HAVE NOTICED. OR THE OPPOSITE, BEING SO FIGETY OR RESTLESS THAT YOU HAVE BEEN MOVING AROUND A LOT MORE THAN USUAL: 0
1. LITTLE INTEREST OR PLEASURE IN DOING THINGS: 0
4. FEELING TIRED OR HAVING LITTLE ENERGY: 0
10. IF YOU CHECKED OFF ANY PROBLEMS, HOW DIFFICULT HAVE THESE PROBLEMS MADE IT FOR YOU TO DO YOUR WORK, TAKE CARE OF THINGS AT HOME, OR GET ALONG WITH OTHER PEOPLE: 0
7. TROUBLE CONCENTRATING ON THINGS, SUCH AS READING THE NEWSPAPER OR WATCHING TELEVISION: 3
5. POOR APPETITE OR OVEREATING: 0
9. THOUGHTS THAT YOU WOULD BE BETTER OFF DEAD, OR OF HURTING YOURSELF: 0
SUM OF ALL RESPONSES TO PHQ QUESTIONS 1-9: 3
3. TROUBLE FALLING OR STAYING ASLEEP: 0
2. FEELING DOWN, DEPRESSED OR HOPELESS: 0
6. FEELING BAD ABOUT YOURSELF - OR THAT YOU ARE A FAILURE OR HAVE LET YOURSELF OR YOUR FAMILY DOWN: 0
SUM OF ALL RESPONSES TO PHQ9 QUESTIONS 1 & 2: 0
SUM OF ALL RESPONSES TO PHQ QUESTIONS 1-9: 3
SUM OF ALL RESPONSES TO PHQ QUESTIONS 1-9: 3

## 2023-04-05 ENCOUNTER — PATIENT MESSAGE (OUTPATIENT)
Dept: FAMILY MEDICINE CLINIC | Age: 33
End: 2023-04-05

## 2023-04-05 DIAGNOSIS — F90.0 ATTENTION DEFICIT HYPERACTIVITY DISORDER (ADHD), PREDOMINANTLY INATTENTIVE TYPE: ICD-10-CM

## 2023-04-05 NOTE — TELEPHONE ENCOUNTER
From: Denise Meadows  To: Dr. Sandro Whiteside: 4/5/2023 2:30 PM EDT  Subject: Refill    Hi, can you please send a refill of my ADHD medication to the [facility] pharmacy on my account?

## 2023-04-06 ENCOUNTER — OFFICE VISIT (OUTPATIENT)
Dept: BARIATRICS/WEIGHT MGMT | Age: 33
End: 2023-04-06
Payer: COMMERCIAL

## 2023-04-06 VITALS — HEIGHT: 67 IN | WEIGHT: 241 LBS | BODY MASS INDEX: 37.83 KG/M2

## 2023-04-06 DIAGNOSIS — E66.01 SEVERE OBESITY (BMI 35.0-35.9 WITH COMORBIDITY) (HCC): Primary | ICD-10-CM

## 2023-04-06 DIAGNOSIS — Z98.84 STATUS POST LAPAROSCOPIC SLEEVE GASTRECTOMY: ICD-10-CM

## 2023-04-06 PROCEDURE — 99213 OFFICE O/P EST LOW 20 MIN: CPT | Performed by: SURGERY

## 2023-04-06 RX ORDER — DEXTROAMPHETAMINE SACCHARATE, AMPHETAMINE ASPARTATE, DEXTROAMPHETAMINE SULFATE AND AMPHETAMINE SULFATE 5; 5; 5; 5 MG/1; MG/1; MG/1; MG/1
20 TABLET ORAL 2 TIMES DAILY
Qty: 60 TABLET | Refills: 0 | Status: SHIPPED | OUTPATIENT
Start: 2023-04-06 | End: 2023-05-06

## 2023-04-06 NOTE — TELEPHONE ENCOUNTER
Lov 3/3/23  Lrf 60 0 3/3/23Medication:   Requested Prescriptions     Pending Prescriptions Disp Refills    amphetamine-dextroamphetamine (ADDERALL, 20MG,) 20 MG tablet 60 tablet 0     Sig: Take 1 tablet by mouth 2 times daily for 30 days. Max Daily Amount: 40 mg        Last Filled:      Patient Phone Number: [phone] (home)     Last appt: 3/3/2023   Next appt: Visit date not found    Last OARRS:   RX Monitoring 1/30/2023   Attestation -   Periodic Controlled Substance Monitoring No signs of potential drug abuse or diversion identified.

## 2023-04-06 NOTE — PROGRESS NOTES
Dietary Assessment Note    Vitals:   Vitals:    04/06/23 1257   Weight: 241 lb (109.3 kg)   Height: 5' 7\" (1.702 m)    Patient lost 23 lbs over 4 months. Total Weight Loss: 83#    Labs reviewed: no lab studies available for review at time of visit    Protein intake: 60-80 grams/day     Fluid intake: 48-64 oz/day    Multivitamin/mineral intake:  Fusion - how many/day: 1 capsule    Calcium intake: Calcium soft chew - 1    Other: Hair/skin/nail     Exercise: Yes. How much: Hiking 1-2x/week and loves going to [facility] over the spring/summer. Nutrition Assessment: 7 month post-op visit. Breakfast: greek yogurt w/ protein granola (kind brand) OR egg whites with veggie omelette     Snack: apples and PB OR CC OR banana OR NV protein bar    Lunch: left overs - salmon/broccoli OR turkey/cheese/ veggies    Snack: None    Dinner: turkey tacos and cheese wraps OR taco bowl OR breakfast for dinner OR grilled chicken salad OR grilled shrimp OR turkey burgers    Snack: None OR SF pudding frozen OR watermelon w/ SF cool whip    30-30-30: Following  Amount at a time: 3/4-1 cup (depending on types of food)    Food Intolerances/issues:  lenard sits heavy in her stomach    Client Concerns: Loose skin causing rashes/discomfort. Tries compression shorts.     Goals:   Continue current patterns with eating routine and trying new recipes/foods    Plan: F/u at 10 months post op      Eliecer Alvarez RD, LD
number of the following: review of labs, imaging, provider notes, outside hospital records; performing examination/evaluation; counseling patient and family; ordering medications/tests; placing referrals and communication with referring physicians; coordination of care, and documentation in the EHR. RTC in 3 months  Diet and Exercise      Patient advised that its their responsibility to follow up for studies and/or labs ordered today.

## 2023-04-25 ENCOUNTER — PATIENT MESSAGE (OUTPATIENT)
Dept: FAMILY MEDICINE CLINIC | Age: 33
End: 2023-04-25

## 2023-04-25 RX ORDER — FEXOFENADINE HCL 180 MG/1
180 TABLET ORAL DAILY
Qty: 90 TABLET | Refills: 1 | Status: SHIPPED | OUTPATIENT
Start: 2023-04-25

## 2023-04-25 NOTE — TELEPHONE ENCOUNTER
Medication:   Requested Prescriptions     Pending Prescriptions Disp Refills    fexofenadine (ALLEGRA) 180 MG tablet 30 tablet 5     Sig: Take 1 tablet by mouth daily        Last Filled:  6/16/2022    Patient Phone Number: [phone] (home)     Last appt: 3/3/2023   Next appt: Visit date not found    Last OARRS:   RX Monitoring 4/6/2023   Attestation -   Periodic Controlled Substance Monitoring No signs of potential drug abuse or diversion identified.

## 2023-04-25 NOTE — TELEPHONE ENCOUNTER
From: Chris Champagne  To: Dr. Rodney Carcamo: 4/25/2023 12:46 PM EDT  Subject: Refill    Hi,    I have run out of refills for my Fexofenadine 180 Mg Tab and would like to have the refill sent to [facility] at [address] [place] [address]  That is the pharmacy I will be sending all my future prescriptions to

## 2023-04-26 ENCOUNTER — PATIENT MESSAGE (OUTPATIENT)
Dept: FAMILY MEDICINE CLINIC | Age: 33
End: 2023-04-26

## 2023-04-26 DIAGNOSIS — F41.9 ANXIETY AND DEPRESSION: ICD-10-CM

## 2023-04-26 DIAGNOSIS — F32.A ANXIETY AND DEPRESSION: ICD-10-CM

## 2023-04-26 RX ORDER — SERTRALINE HYDROCHLORIDE 100 MG/1
100 TABLET, FILM COATED ORAL DAILY
Qty: 90 TABLET | Refills: 0 | Status: SHIPPED | OUTPATIENT
Start: 2023-04-26

## 2023-04-26 NOTE — TELEPHONE ENCOUNTER
Medication:   Requested Prescriptions     Pending Prescriptions Disp Refills    sertraline (ZOLOFT) 100 MG tablet 90 tablet 1     Sig: Take 1 tablet by mouth daily        Last Filled:  9/22/2022    Patient Phone Number: [phone] (home)     Last appt: 3/3/2023   Next appt: Visit date not found    Last OARRS:   RX Monitoring 4/6/2023   Attestation -   Periodic Controlled Substance Monitoring No signs of potential drug abuse or diversion identified.

## 2023-04-26 NOTE — TELEPHONE ENCOUNTER
From: Ana Stoner  To: Dr. Amador Acosta: 4/26/2023 2:00 PM EDT  Subject: Refill    Hi,     I have run out of refills for my Sertraline 100mg Tab and would like to have the refill sent to [facility] at [address] That is the pharmacy I will be sending all my future prescriptions to, thanks!

## 2023-04-27 ENCOUNTER — E-VISIT (OUTPATIENT)
Dept: FAMILY MEDICINE CLINIC | Age: 33
End: 2023-04-27
Payer: COMMERCIAL

## 2023-04-27 DIAGNOSIS — J06.9 PROTRACTED URI: Primary | ICD-10-CM

## 2023-04-27 DIAGNOSIS — R09.82 PND (POST-NASAL DRIP): ICD-10-CM

## 2023-04-27 PROCEDURE — 99422 OL DIG E/M SVC 11-20 MIN: CPT | Performed by: FAMILY MEDICINE

## 2023-04-27 RX ORDER — CLINDAMYCIN HYDROCHLORIDE 300 MG/1
300 CAPSULE ORAL 2 TIMES DAILY
Qty: 14 CAPSULE | Refills: 0 | Status: SHIPPED | OUTPATIENT
Start: 2023-04-27 | End: 2023-05-04

## 2023-04-27 RX ORDER — FLUTICASONE PROPIONATE 50 MCG
2 SPRAY, SUSPENSION (ML) NASAL DAILY
Qty: 16 G | Refills: 1 | Status: SHIPPED | OUTPATIENT
Start: 2023-04-27

## 2023-04-27 ASSESSMENT — LIFESTYLE VARIABLES
SMOKING_STATUS: NO, I'M A FORMER SMOKER
PACKS_PER_DAY: .5
SMOKING_YEARS: 2

## 2023-04-27 NOTE — PROGRESS NOTES
Evisit info reviewed     Verl Dates protracted  PND    Clindamycin BID for 7-10 days   Flonase nasal spray  Follow up if sxs persist or worsen and for routine care with PCP     8 min spent reviewing info, Pt allergies and documenting

## 2023-05-01 ENCOUNTER — TELEMEDICINE (OUTPATIENT)
Dept: FAMILY MEDICINE CLINIC | Age: 33
End: 2023-05-01
Payer: COMMERCIAL

## 2023-05-01 DIAGNOSIS — F32.A ANXIETY AND DEPRESSION: Primary | ICD-10-CM

## 2023-05-01 DIAGNOSIS — F90.0 ATTENTION DEFICIT HYPERACTIVITY DISORDER (ADHD), PREDOMINANTLY INATTENTIVE TYPE: ICD-10-CM

## 2023-05-01 DIAGNOSIS — F41.9 ANXIETY AND DEPRESSION: Primary | ICD-10-CM

## 2023-05-01 PROCEDURE — 99213 OFFICE O/P EST LOW 20 MIN: CPT | Performed by: FAMILY MEDICINE

## 2023-05-01 RX ORDER — OMEPRAZOLE 20 MG/1
20 CAPSULE, DELAYED RELEASE ORAL
Qty: 60 CAPSULE | Refills: 0 | Status: SHIPPED | OUTPATIENT
Start: 2023-05-01

## 2023-05-01 RX ORDER — ONDANSETRON 4 MG/1
4 TABLET, FILM COATED ORAL EVERY 6 HOURS PRN
Qty: 30 TABLET | Refills: 1 | Status: SHIPPED | OUTPATIENT
Start: 2023-05-01

## 2023-05-01 NOTE — PROGRESS NOTES
Nixon Saba is a 28 y.o. female. HPI:  Nixon Saba, was evaluated through a synchronous (real-time) audio-video encounter. The patient (or guardian if applicable) is aware that this is a billable service, which includes applicable co-pays. This Virtual Visit was conducted with patient's (and/or legal guardian's) consent. The visit was conducted pursuant to the emergency declaration under the [address] [place] [address], [address] [place] [address] authority and the [employer] Act. Patient identification was verified, and a caregiver was present when appropriate. The patient was located in a state where the provider was licensed to provide care. --Rainer Goodwin MD on 5/1/2023 at 12:34 PM    An electronic signature was used to authenticate this note. Med check video, we started Zoloft in April    Last  weight = 233 lbs   Zoloft 100 mg = helping   Recommend Vit D 3 2000 units daily      Due for GYN exam in the future  Recommend most recent COVID booster also       Wt Readings from Last 3 Encounters:   04/06/23 241 lb (109.3 kg)   03/03/23 247 lb (112 kg)   01/16/23 253 lb 5 oz (114.9 kg)       REVIEW OF SYSTEMS:   CONSTITUTIONAL: See history of present illness,   Weight noted   HEENT: No new vision difficulties or ringing in the ears. RESPIRATORY: No new SOB, PND, orthopnea or cough. CARDIOVASCULAR: no CP, palpitations or SOB with exertion  GI: No nausea, vomiting, diarrhea, constipation, abdominal pain or changes in bowel habits. : No urinary frequency, urgency, incontinence hematuria or dysuria. SKIN: No cyanosis or skin lesions. MUSCULOSKELETAL: No new muscle or joint pain. NEUROLOGICAL: No syncope or TIA-like symptoms.    PSYCHIATRIC: 100 mg Zoloft helping her depression, recommend adding vitamin D3    Allergies   Allergen Reactions    Cephalosporins Anaphylaxis    Penicillins Anaphylaxis       Prior to Visit Medications

## 2023-05-01 NOTE — PROGRESS NOTES
Low back strain/discomfort    Heat muscle relaxant/sent to pharmacy  Alternate Tylenol with Advil 6 to 8 hours    Video visit with Dr. Aminta Dorantes or office visit if not improving    8 minutes spent in this my chart message  Reviewing chart, allergies etc. Cephalexin Counseling: I counseled the patient regarding use of cephalexin as an antibiotic for prophylactic and/or therapeutic purposes. Cephalexin (commonly prescribed under brand name Keflex) is a cephalosporin antibiotic which is active against numerous classes of bacteria, including most skin bacteria. Side effects may include nausea, diarrhea, gastrointestinal upset, rash, hives, yeast infections, and in rare cases, hepatitis, kidney disease, seizures, fever, confusion, neurologic symptoms, and others. Patients with severe allergies to penicillin medications are cautioned that there is about a 10% incidence of cross-reactivity with cephalosporins. When possible, patients with penicillin allergies should use alternatives to cephalosporins for antibiotic therapy.

## 2023-05-05 ENCOUNTER — PATIENT MESSAGE (OUTPATIENT)
Dept: FAMILY MEDICINE CLINIC | Age: 33
End: 2023-05-05

## 2023-05-05 DIAGNOSIS — F90.0 ATTENTION DEFICIT HYPERACTIVITY DISORDER (ADHD), PREDOMINANTLY INATTENTIVE TYPE: ICD-10-CM

## 2023-05-05 RX ORDER — DEXTROAMPHETAMINE SACCHARATE, AMPHETAMINE ASPARTATE, DEXTROAMPHETAMINE SULFATE AND AMPHETAMINE SULFATE 5; 5; 5; 5 MG/1; MG/1; MG/1; MG/1
20 TABLET ORAL 2 TIMES DAILY
Qty: 60 TABLET | Refills: 0 | Status: SHIPPED | OUTPATIENT
Start: 2023-05-05 | End: 2023-06-04

## 2023-05-05 NOTE — TELEPHONE ENCOUNTER
From: Carlita Gomes  To: Dr. Miriam Hernandez: 5/5/2023 10:53 AM EDT  Subject: Refill Adderall    Can I please have a refill of my ADHD medication sent in to the [address] [place] [address] listed on my account?

## 2023-05-05 NOTE — TELEPHONE ENCOUNTER
Medication:   Requested Prescriptions     Pending Prescriptions Disp Refills    amphetamine-dextroamphetamine (ADDERALL, 20MG,) 20 MG tablet 60 tablet 0     Sig: Take 1 tablet by mouth 2 times daily for 30 days. Max Daily Amount: 40 mg        Last Filled:  4/6/2023    Patient Phone Number: [phone] (home)     Last appt: 5/1/2023   Next appt: Visit date not found    Last OARRS:   4/6/2023 1/30/2023      No signs of potential drug abuse or diversion identified. No signs of potential drug abuse or diversion identified.

## 2023-06-03 ENCOUNTER — APPOINTMENT (OUTPATIENT)
Dept: CT IMAGING | Age: 33
End: 2023-06-03
Payer: COMMERCIAL

## 2023-06-03 ENCOUNTER — HOSPITAL ENCOUNTER (EMERGENCY)
Age: 33
Discharge: HOME | End: 2023-06-03
Payer: COMMERCIAL

## 2023-06-03 VITALS
TEMPERATURE: 98.4 F | OXYGEN SATURATION: 99 % | HEIGHT: 67 IN | WEIGHT: 238.1 LBS | HEART RATE: 76 BPM | BODY MASS INDEX: 37.37 KG/M2 | RESPIRATION RATE: 18 BRPM | SYSTOLIC BLOOD PRESSURE: 121 MMHG | DIASTOLIC BLOOD PRESSURE: 106 MMHG

## 2023-06-03 DIAGNOSIS — S09.90XA INJURY OF HEAD, INITIAL ENCOUNTER: ICD-10-CM
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
## 2023-06-03 DIAGNOSIS — S06.0X0A CONCUSSION WITHOUT LOSS OF CONSCIOUSNESS, INITIAL ENCOUNTER: ICD-10-CM

## 2023-06-03 DIAGNOSIS — S20.212A RIB CONTUSION, LEFT, INITIAL ENCOUNTER: ICD-10-CM

## 2023-06-03 LAB
ALBUMIN SERPL-MCNC: 4.2 G/DL (ref 3.4–5)
ALBUMIN/GLOB SERPL: 1.4 {RATIO} (ref 1.1–2.2)
ALP SERPL-CCNC: 67 U/L (ref 40–129)
ALT SERPL-CCNC: 15 U/L (ref 10–40)
ANION GAP SERPL CALCULATED.3IONS-SCNC: 13 MMOL/L (ref 3–16)
AST SERPL-CCNC: 17 U/L (ref 15–37)
BASOPHILS # BLD: 0.1 K/UL (ref 0–0.2)
BASOPHILS NFR BLD: 0.9 %
BILIRUB SERPL-MCNC: 0.3 MG/DL (ref 0–1)
BUN SERPL-MCNC: 15 MG/DL (ref 7–20)
CALCIUM SERPL-MCNC: 9.1 MG/DL (ref 8.3–10.6)
CHLORIDE SERPL-SCNC: 105 MMOL/L (ref 99–110)
CO2 SERPL-SCNC: 22 MMOL/L (ref 21–32)
CREAT SERPL-MCNC: 0.6 MG/DL (ref 0.6–1.1)
DEPRECATED RDW RBC AUTO: 12.5 % (ref 12.4–15.4)
EOSINOPHIL # BLD: 0.1 K/UL (ref 0–0.6)
EOSINOPHIL NFR BLD: 1.1 %
GFR SERPLBLD CREATININE-BSD FMLA CKD-EPI: >60 ML/MIN/{1.73_M2}
GLUCOSE SERPL-MCNC: 95 MG/DL (ref 70–99)
HCG SERPL QL: NEGATIVE
HCT VFR BLD AUTO: 38.9 % (ref 36–48)
HGB BLD-MCNC: 13.5 G/DL (ref 12–16)
LYMPHOCYTES # BLD: 2.3 K/UL (ref 1–5.1)
LYMPHOCYTES NFR BLD: 23.2 %
MCH RBC QN AUTO: 29 PG (ref 26–34)
MCHC RBC AUTO-ENTMCNC: 34.8 G/DL (ref 31–36)
MCV RBC AUTO: 83.4 FL (ref 80–100)
MONOCYTES # BLD: 0.6 K/UL (ref 0–1.3)
MONOCYTES NFR BLD: 6.5 %
NEUTROPHILS # BLD: 6.7 K/UL (ref 1.7–7.7)
NEUTROPHILS NFR BLD: 68.3 %
PLATELET # BLD AUTO: 201 K/UL (ref 135–450)
PMV BLD AUTO: 8.2 FL (ref 5–10.5)
POTASSIUM SERPL-SCNC: 3.6 MMOL/L (ref 3.5–5.1)
PROT SERPL-MCNC: 7.2 G/DL (ref 6.4–8.2)
RBC # BLD AUTO: 4.67 M/UL (ref 4–5.2)
SODIUM SERPL-SCNC: 140 MMOL/L (ref 136–145)
WBC # BLD AUTO: 9.8 K/UL (ref 4–11)

## 2023-06-03 PROCEDURE — 70450 CT HEAD/BRAIN W/O DYE: CPT

## 2023-06-03 PROCEDURE — 84703 CHORIONIC GONADOTROPIN ASSAY: CPT

## 2023-06-03 PROCEDURE — 80053 COMPREHEN METABOLIC PANEL: CPT

## 2023-06-03 PROCEDURE — 72125 CT NECK SPINE W/O DYE: CPT

## 2023-06-03 PROCEDURE — 71260 CT THORAX DX C+: CPT

## 2023-06-03 PROCEDURE — 6360000004 HC RX CONTRAST MEDICATION: Performed by: PHYSICIAN ASSISTANT

## 2023-06-03 PROCEDURE — 99285 EMERGENCY DEPT VISIT HI MDM: CPT

## 2023-06-03 PROCEDURE — 85025 COMPLETE CBC W/AUTO DIFF WBC: CPT

## 2023-06-03 PROCEDURE — 6370000000 HC RX 637 (ALT 250 FOR IP): Performed by: PHYSICIAN ASSISTANT

## 2023-06-03 RX ORDER — ONDANSETRON 4 MG/1
TABLET, FILM COATED ORAL
Status: DISCONTINUED
Start: 2023-06-03 | End: 2023-06-04 | Stop reason: HOSPADM

## 2023-06-03 RX ORDER — ONDANSETRON 4 MG/1
4 TABLET, ORALLY DISINTEGRATING ORAL ONCE
Status: COMPLETED | OUTPATIENT
Start: 2023-06-03 | End: 2023-06-03

## 2023-06-03 RX ORDER — CYCLOBENZAPRINE HCL 10 MG
10 TABLET ORAL 3 TIMES DAILY PRN
Qty: 12 TABLET | Refills: 0 | Status: SHIPPED | OUTPATIENT
Start: 2023-06-03 | End: 2023-06-13

## 2023-06-03 RX ORDER — ACETAMINOPHEN 325 MG/1
650 TABLET ORAL ONCE
Status: COMPLETED | OUTPATIENT
Start: 2023-06-03 | End: 2023-06-03

## 2023-06-03 RX ADMIN — ACETAMINOPHEN 650 MG: 325 TABLET ORAL at 22:07

## 2023-06-03 RX ADMIN — IOPAMIDOL 75 ML: 755 INJECTION, SOLUTION INTRAVENOUS at 22:44

## 2023-06-03 RX ADMIN — ONDANSETRON 4 MG: 4 TABLET, ORALLY DISINTEGRATING ORAL at 22:07

## 2023-06-03 ASSESSMENT — PAIN - FUNCTIONAL ASSESSMENT: PAIN_FUNCTIONAL_ASSESSMENT: 0-10

## 2023-06-03 ASSESSMENT — PAIN SCALES - GENERAL
PAINLEVEL_OUTOF10: 8
PAINLEVEL_OUTOF10: 8

## 2023-06-04 ASSESSMENT — ENCOUNTER SYMPTOMS
SHORTNESS OF BREATH: 0
BACK PAIN: 1
NAUSEA: 1
VOMITING: 0
COLOR CHANGE: 0

## 2023-06-09 DIAGNOSIS — F90.0 ATTENTION DEFICIT HYPERACTIVITY DISORDER (ADHD), PREDOMINANTLY INATTENTIVE TYPE: ICD-10-CM

## 2023-06-09 RX ORDER — DEXTROAMPHETAMINE SACCHARATE, AMPHETAMINE ASPARTATE, DEXTROAMPHETAMINE SULFATE AND AMPHETAMINE SULFATE 5; 5; 5; 5 MG/1; MG/1; MG/1; MG/1
20 TABLET ORAL 2 TIMES DAILY
Qty: 60 TABLET | Refills: 0 | Status: SHIPPED | OUTPATIENT
Start: 2023-06-09 | End: 2023-07-09

## 2023-06-09 NOTE — TELEPHONE ENCOUNTER
Lov 5/1/23  Lrf 60 0 5/5/23 Medication:   Requested Prescriptions     Pending Prescriptions Disp Refills    amphetamine-dextroamphetamine (ADDERALL, 20MG,) 20 MG tablet 60 tablet 0     Sig: Take 1 tablet by mouth 2 times daily for 30 days. Max Daily Amount: 40 mg        Last Filled:      Patient Phone Number: [phone] (home)     Last appt: 5/1/2023   Next appt: Visit date not found    Last OARRS:   RX Monitoring 5/5/2023   Attestation -   Periodic Controlled Substance Monitoring No signs of potential drug abuse or diversion identified.

## 2023-07-06 ENCOUNTER — TELEPHONE (OUTPATIENT)
Dept: BARIATRICS/WEIGHT MGMT | Age: 33
End: 2023-07-06

## 2023-07-12 ENCOUNTER — OFFICE VISIT (OUTPATIENT)
Dept: FAMILY MEDICINE CLINIC | Age: 33
End: 2023-07-12

## 2023-07-12 VITALS
BODY MASS INDEX: 35.35 KG/M2 | DIASTOLIC BLOOD PRESSURE: 84 MMHG | HEIGHT: 67 IN | RESPIRATION RATE: 16 BRPM | HEART RATE: 94 BPM | SYSTOLIC BLOOD PRESSURE: 129 MMHG | OXYGEN SATURATION: 99 % | WEIGHT: 225.2 LBS

## 2023-07-12 DIAGNOSIS — F90.0 ATTENTION DEFICIT HYPERACTIVITY DISORDER (ADHD), PREDOMINANTLY INATTENTIVE TYPE: ICD-10-CM

## 2023-07-12 DIAGNOSIS — F32.A ANXIETY AND DEPRESSION: ICD-10-CM

## 2023-07-12 DIAGNOSIS — Z3A.01 LESS THAN 8 WEEKS GESTATION OF PREGNANCY: Primary | ICD-10-CM

## 2023-07-12 DIAGNOSIS — F41.9 ANXIETY AND DEPRESSION: ICD-10-CM

## 2023-07-12 PROCEDURE — 99213 OFFICE O/P EST LOW 20 MIN: CPT | Performed by: FAMILY MEDICINE

## 2023-07-12 RX ORDER — ONDANSETRON 4 MG/1
4 TABLET, ORALLY DISINTEGRATING ORAL EVERY 8 HOURS PRN
Qty: 21 TABLET | Refills: 1 | Status: SHIPPED | OUTPATIENT
Start: 2023-07-12

## 2023-07-12 RX ORDER — PNV NO.95/FERROUS FUM/FOLIC AC 28MG-0.8MG
TABLET ORAL
Qty: 90 TABLET | Refills: 1 | Status: SHIPPED | OUTPATIENT
Start: 2023-07-12

## 2023-07-12 NOTE — PROGRESS NOTES
Carie Caraballo is a 35 y.o. female. HPI:  Here for med review  Patient just found out she is pregnant  Has never been pregnant before  Could not get pregnant because of her weight    LMP= 5/172023  EDC= 2/20/ 2024    Meds reviewed in detail  Has not been taking Adderall, Maxalt, Prilosec. .. Stay off these during pregnancy    Okay continue Zoloft  Order for prenatal vitamins given    Has GYN appointment in bariatric appointment soon    Complaining of being very nauseated    Meds, vitamins and allergies reviewed with pt    Wt Readings from Last 3 Encounters:   07/12/23 225 lb 3.2 oz (102.2 kg)   06/03/23 238 lb 1.6 oz (108 kg)   04/06/23 241 lb (109.3 kg)       REVIEW OF SYSTEMS:   CONSTITUTIONAL: See history of present illness,   Weight noted   HEENT: No new vision difficulties or ringing in the ears. RESPIRATORY: No new SOB, PND, orthopnea or cough. CARDIOVASCULAR: no CP, palpitations or SOB with exertion  GI: No nausea, vomiting, diarrhea, constipation, abdominal pain or changes in bowel habits. : No urinary frequency, urgency, incontinence hematuria or dysuria. SKIN: No cyanosis or skin lesions. MUSCULOSKELETAL: No new muscle or joint pain. NEUROLOGICAL: No syncope or TIA-like symptoms. PSYCHIATRIC: No anxiety, insomnia or depression     Allergies   Allergen Reactions    Cephalosporins Anaphylaxis    Penicillins Anaphylaxis       Prior to Visit Medications    Medication Sig Taking?  Authorizing Provider   Prenatal Vit-Fe Fumarate-FA (PRENATAL VITAMINS) 28-0.8 MG TABS Take po nightly Yes Ralph Rae MD   ondansetron (ZOFRAN-ODT) 4 MG disintegrating tablet Take 1 tablet by mouth every 8 hours as needed for Nausea or Vomiting Yes Ralph Rae MD   ondansetron (ZOFRAN) 4 MG tablet Take 1 tablet by mouth every 6 hours as needed for Nausea or Vomiting Yes Ralph Rae MD   fluticasone (FLONASE) 50 MCG/ACT nasal spray 2 sprays by Each Nostril route daily Yes Ralph Rae MD   sertraline

## 2023-07-17 ENCOUNTER — OFFICE VISIT (OUTPATIENT)
Dept: BARIATRICS/WEIGHT MGMT | Age: 33
End: 2023-07-17

## 2023-07-17 VITALS — WEIGHT: 229 LBS | BODY MASS INDEX: 35.94 KG/M2 | HEIGHT: 67 IN

## 2023-07-17 DIAGNOSIS — E66.01 SEVERE OBESITY (BMI 35.0-35.9 WITH COMORBIDITY) (HCC): Primary | ICD-10-CM

## 2023-07-17 DIAGNOSIS — Z98.84 STATUS POST LAPAROSCOPIC SLEEVE GASTRECTOMY: ICD-10-CM

## 2023-07-17 PROCEDURE — 99214 OFFICE O/P EST MOD 30 MIN: CPT | Performed by: SURGERY

## 2023-07-17 NOTE — PROGRESS NOTES
Dietary Assessment Note    Vitals:   Vitals:    07/17/23 0722   Weight: 229 lb (103.9 kg)   Height: 5' 7\" (1.702 m)    Patient lost 12 lbs over 3 months. Total Weight Loss: 95#    Labs reviewed: labs reviewed & normal    Protein intake: 60 grams/day     Fluid intake: 32-48 oz/day    Multivitamin/mineral intake:  Fusion - how many/day: 1 capsule  / Prenatal - alternating     Calcium intake: Citracal 2/day     Other: Hair/skin/nail - stopped taking once pregnant    Exercise: Yes. How much: Hiking 1-2x/week and loves going to [facility] over the spring/summer. Nutrition Assessment: 10 month post-op visit. Pt is pregnant, experiencing morning sickness  Breakfast: PB crackers OR 1/2 english muffin + [place]/egg/cheese  Snack: none OR PB crackers OR fruit  Lunch: Caesar salad w grilled chix +cheese OR 1/2 sandwich (ham +cheese) OR 1/2 breakfast sandwich OR veggies w greek yogurt ranch   Snack: None OR PB crackers OR NV protein bars  Dinner: salmon + sweet potato + broccoli OR roast w carrots + potatoes OR hamburger judie  Snack: None OR PB crackers  Drinks: diet tea, unsweetened tea, water, cherry 7-up zero/ gingerale (last case resort)    30-30-30: Following  Amount at a time: 3/4-1 cup (depending on types of food)    Food Intolerances/issues:  tortilla/bread sits heavy in her stomach    Client Concerns: Pt is pregnant    Goals:  Focus on maintaining weight, not weight loss, while pregnant     -Eat frequent, structured meals & snacks (4-5x/day)   -Include a protein source w all meals & snacks -Incorporate protein shake as needed  -Increase daily fluid intake  -Start tracking intake- Aim for 1,200 kcal (increase during 2nd & 3rd trimester) & 60-80 g protein/day  -Continue current daily exercise as tolerated  -Continue MVI per OBGYN     Handouts: Pregnancy after surgery     Plan: F/u per provider    Farrah Dobbs RD

## 2023-10-16 DIAGNOSIS — R05.9 COUGH: ICD-10-CM

## 2023-10-16 RX ORDER — ALBUTEROL SULFATE 90 UG/1
2 AEROSOL, METERED RESPIRATORY (INHALATION) EVERY 4 HOURS PRN
Qty: 18 G | Refills: 2 | Status: SHIPPED | OUTPATIENT
Start: 2023-10-16

## 2024-01-05 DIAGNOSIS — F32.A ANXIETY AND DEPRESSION: ICD-10-CM

## 2024-01-05 DIAGNOSIS — F41.9 ANXIETY AND DEPRESSION: ICD-10-CM

## 2024-01-05 RX ORDER — ONDANSETRON 4 MG/1
4 TABLET, ORALLY DISINTEGRATING ORAL EVERY 8 HOURS PRN
Qty: 21 TABLET | Refills: 1 | Status: SHIPPED | OUTPATIENT
Start: 2024-01-05

## 2024-01-05 RX ORDER — SERTRALINE HYDROCHLORIDE 100 MG/1
100 TABLET, FILM COATED ORAL DAILY
Qty: 90 TABLET | Refills: 0 | Status: SHIPPED | OUTPATIENT
Start: 2024-01-05

## 2024-01-05 RX ORDER — PNV NO.95/FERROUS FUM/FOLIC AC 28MG-0.8MG
TABLET ORAL
Qty: 90 TABLET | Refills: 1 | Status: SHIPPED | OUTPATIENT
Start: 2024-01-05

## 2024-02-02 RX ORDER — PNV NO.95/FERROUS FUM/FOLIC AC 28MG-0.8MG
TABLET ORAL
Qty: 90 TABLET | Refills: 1 | Status: SHIPPED | OUTPATIENT
Start: 2024-02-02

## 2024-03-03 ASSESSMENT — PATIENT HEALTH QUESTIONNAIRE - PHQ9
SUM OF ALL RESPONSES TO PHQ QUESTIONS 1-9: 1
2. FEELING DOWN, DEPRESSED OR HOPELESS: 0
10. IF YOU CHECKED OFF ANY PROBLEMS, HOW DIFFICULT HAVE THESE PROBLEMS MADE IT FOR YOU TO DO YOUR WORK, TAKE CARE OF THINGS AT HOME, OR GET ALONG WITH OTHER PEOPLE: 1
4. FEELING TIRED OR HAVING LITTLE ENERGY: 0
SUM OF ALL RESPONSES TO PHQ QUESTIONS 1-9: 1
8. MOVING OR SPEAKING SO SLOWLY THAT OTHER PEOPLE COULD HAVE NOTICED. OR THE OPPOSITE, BEING SO FIGETY OR RESTLESS THAT YOU HAVE BEEN MOVING AROUND A LOT MORE THAN USUAL: 0
3. TROUBLE FALLING OR STAYING ASLEEP: NOT AT ALL
7. TROUBLE CONCENTRATING ON THINGS, SUCH AS READING THE NEWSPAPER OR WATCHING TELEVISION: SEVERAL DAYS
9. THOUGHTS THAT YOU WOULD BE BETTER OFF DEAD, OR OF HURTING YOURSELF: 0
7. TROUBLE CONCENTRATING ON THINGS, SUCH AS READING THE NEWSPAPER OR WATCHING TELEVISION: 1
1. LITTLE INTEREST OR PLEASURE IN DOING THINGS: NOT AT ALL
5. POOR APPETITE OR OVEREATING: NOT AT ALL
10. IF YOU CHECKED OFF ANY PROBLEMS, HOW DIFFICULT HAVE THESE PROBLEMS MADE IT FOR YOU TO DO YOUR WORK, TAKE CARE OF THINGS AT HOME, OR GET ALONG WITH OTHER PEOPLE: SOMEWHAT DIFFICULT
SUM OF ALL RESPONSES TO PHQ QUESTIONS 1-9: 1
5. POOR APPETITE OR OVEREATING: 0
8. MOVING OR SPEAKING SO SLOWLY THAT OTHER PEOPLE COULD HAVE NOTICED. OR THE OPPOSITE - BEING SO FIDGETY OR RESTLESS THAT YOU HAVE BEEN MOVING AROUND A LOT MORE THAN USUAL: NOT AT ALL
3. TROUBLE FALLING OR STAYING ASLEEP: 0
6. FEELING BAD ABOUT YOURSELF - OR THAT YOU ARE A FAILURE OR HAVE LET YOURSELF OR YOUR FAMILY DOWN: NOT AT ALL
2. FEELING DOWN, DEPRESSED OR HOPELESS: NOT AT ALL
9. THOUGHTS THAT YOU WOULD BE BETTER OFF DEAD, OR OF HURTING YOURSELF: NOT AT ALL
4. FEELING TIRED OR HAVING LITTLE ENERGY: NOT AT ALL
6. FEELING BAD ABOUT YOURSELF - OR THAT YOU ARE A FAILURE OR HAVE LET YOURSELF OR YOUR FAMILY DOWN: 0
1. LITTLE INTEREST OR PLEASURE IN DOING THINGS: 0
SUM OF ALL RESPONSES TO PHQ QUESTIONS 1-9: 1
SUM OF ALL RESPONSES TO PHQ9 QUESTIONS 1 & 2: 0
SUM OF ALL RESPONSES TO PHQ QUESTIONS 1-9: 1

## 2024-03-06 ENCOUNTER — OFFICE VISIT (OUTPATIENT)
Dept: FAMILY MEDICINE CLINIC | Age: 34
End: 2024-03-06
Payer: COMMERCIAL

## 2024-03-06 VITALS
HEART RATE: 78 BPM | WEIGHT: 268.2 LBS | BODY MASS INDEX: 42.09 KG/M2 | OXYGEN SATURATION: 97 % | HEIGHT: 67 IN | DIASTOLIC BLOOD PRESSURE: 81 MMHG | SYSTOLIC BLOOD PRESSURE: 120 MMHG

## 2024-03-06 DIAGNOSIS — Z90.3 S/P GASTRIC SLEEVE PROCEDURE: ICD-10-CM

## 2024-03-06 DIAGNOSIS — D50.0 IRON DEFICIENCY ANEMIA DUE TO CHRONIC BLOOD LOSS: ICD-10-CM

## 2024-03-06 DIAGNOSIS — F90.0 ATTENTION DEFICIT HYPERACTIVITY DISORDER (ADHD), PREDOMINANTLY INATTENTIVE TYPE: Primary | ICD-10-CM

## 2024-03-06 LAB
BASOPHILS # BLD: 0.1 K/UL (ref 0–0.2)
BASOPHILS NFR BLD: 1.3 %
DEPRECATED RDW RBC AUTO: 12.4 % (ref 12.4–15.4)
EOSINOPHIL # BLD: 0.3 K/UL (ref 0–0.6)
EOSINOPHIL NFR BLD: 3.3 %
FOLATE SERPL-MCNC: >20 NG/ML (ref 4.78–24.2)
HCT VFR BLD AUTO: 39.9 % (ref 36–48)
HGB BLD-MCNC: 14.2 G/DL (ref 12–16)
LYMPHOCYTES # BLD: 2.2 K/UL (ref 1–5.1)
LYMPHOCYTES NFR BLD: 27 %
MCH RBC QN AUTO: 29.8 PG (ref 26–34)
MCHC RBC AUTO-ENTMCNC: 35.7 G/DL (ref 31–36)
MCV RBC AUTO: 83.5 FL (ref 80–100)
MONOCYTES # BLD: 0.5 K/UL (ref 0–1.3)
MONOCYTES NFR BLD: 6 %
NEUTROPHILS # BLD: 5 K/UL (ref 1.7–7.7)
NEUTROPHILS NFR BLD: 62.4 %
PLATELET # BLD AUTO: 233 K/UL (ref 135–450)
PMV BLD AUTO: 9.4 FL (ref 5–10.5)
RBC # BLD AUTO: 4.78 M/UL (ref 4–5.2)
TSH SERPL DL<=0.005 MIU/L-ACNC: 0.33 UIU/ML (ref 0.27–4.2)
VIT B12 SERPL-MCNC: 853 PG/ML (ref 211–911)
WBC # BLD AUTO: 8.1 K/UL (ref 4–11)

## 2024-03-06 PROCEDURE — 1036F TOBACCO NON-USER: CPT | Performed by: FAMILY MEDICINE

## 2024-03-06 PROCEDURE — G8417 CALC BMI ABV UP PARAM F/U: HCPCS | Performed by: FAMILY MEDICINE

## 2024-03-06 PROCEDURE — G8427 DOCREV CUR MEDS BY ELIG CLIN: HCPCS | Performed by: FAMILY MEDICINE

## 2024-03-06 PROCEDURE — G8484 FLU IMMUNIZE NO ADMIN: HCPCS | Performed by: FAMILY MEDICINE

## 2024-03-06 PROCEDURE — 99214 OFFICE O/P EST MOD 30 MIN: CPT | Performed by: FAMILY MEDICINE

## 2024-03-06 RX ORDER — DEXTROAMPHETAMINE SACCHARATE, AMPHETAMINE ASPARTATE MONOHYDRATE, DEXTROAMPHETAMINE SULFATE AND AMPHETAMINE SULFATE 3.75; 3.75; 3.75; 3.75 MG/1; MG/1; MG/1; MG/1
15 CAPSULE, EXTENDED RELEASE ORAL DAILY
Qty: 30 CAPSULE | Refills: 0 | Status: SHIPPED | OUTPATIENT
Start: 2024-03-06 | End: 2024-05-05

## 2024-03-06 RX ORDER — EPINEPHRINE 0.3 MG/.3ML
0.3 INJECTION SUBCUTANEOUS ONCE
Qty: 0.3 ML | Refills: 0 | Status: SHIPPED | OUTPATIENT
Start: 2024-03-06 | End: 2024-03-06

## 2024-03-06 SDOH — ECONOMIC STABILITY: INCOME INSECURITY: HOW HARD IS IT FOR YOU TO PAY FOR THE VERY BASICS LIKE FOOD, HOUSING, MEDICAL CARE, AND HEATING?: NOT HARD AT ALL

## 2024-03-06 SDOH — ECONOMIC STABILITY: FOOD INSECURITY: WITHIN THE PAST 12 MONTHS, YOU WORRIED THAT YOUR FOOD WOULD RUN OUT BEFORE YOU GOT MONEY TO BUY MORE.: NEVER TRUE

## 2024-03-06 SDOH — ECONOMIC STABILITY: FOOD INSECURITY: WITHIN THE PAST 12 MONTHS, THE FOOD YOU BOUGHT JUST DIDN'T LAST AND YOU DIDN'T HAVE MONEY TO GET MORE.: NEVER TRUE

## 2024-03-06 NOTE — PROGRESS NOTES
Braydon Marcial is a 33 y.o. female.    HPI:  Here for blood pressure check, to discuss getting back on Adderall for ADD  Overall doing well  Carpal tunnel seems to be improving after delivery... Continue to monitor  Follow-up if symptoms persist with  hand group who she saw initially    She had a lot of water weight, was over 300 pounds at delivery, weight now 268    States she was not preeclamptic, no protein in her urine  She did have blood pressure elevation near delivery time    Status post sleeve gastrectomy 2022... Back on bariatric vitamins   , delivered at          Not nursing baby girl,/Ronna is formula fed... Trial of nursing did not work out    In good spirits, on Zoloft, no concerns about depression      Meds, vitamins and allergies reviewed with pt    Wt Readings from Last 3 Encounters:   24 121.7 kg (268 lb 3.2 oz)   23 103.9 kg (229 lb)   23 102.2 kg (225 lb 3.2 oz)       REVIEW OF SYSTEMS:   CONSTITUTIONAL: See history of present illness,   Weight noted, 2 weeks postpartum  HEENT: No new vision difficulties or ringing in the ears.   RESPIRATORY: No new SOB, PND, orthopnea or cough.   CARDIOVASCULAR: no CP, palpitations or SOB with exertion  GI: No nausea, vomiting, diarrhea, constipation, abdominal pain or changes in bowel habits.   : No urinary frequency, urgency, incontinence hematuria or dysuria.   SKIN: No cyanosis or skin lesions.   MUSCULOSKELETAL: No new muscle or joint pain.   NEUROLOGICAL: No syncope or TIA-like symptoms.   PSYCHIATRIC: No anxiety, insomnia or depression     Allergies   Allergen Reactions    Cephalosporins Anaphylaxis    Penicillins Anaphylaxis       Prior to Visit Medications    Medication Sig Taking? Authorizing Provider   EPINEPHrine (EPIPEN 2-SANDER) 0.3 MG/0.3ML SOAJ injection Inject 0.3 mLs into the muscle once for 1 dose Use as directed for allergic reaction Yes Ghazal Shay MD   amphetamine-dextroamphetamine (ADDERALL

## 2024-04-05 DIAGNOSIS — F90.0 ATTENTION DEFICIT HYPERACTIVITY DISORDER (ADHD), PREDOMINANTLY INATTENTIVE TYPE: ICD-10-CM

## 2024-04-05 RX ORDER — DEXTROAMPHETAMINE SACCHARATE, AMPHETAMINE ASPARTATE MONOHYDRATE, DEXTROAMPHETAMINE SULFATE AND AMPHETAMINE SULFATE 3.75; 3.75; 3.75; 3.75 MG/1; MG/1; MG/1; MG/1
15 CAPSULE, EXTENDED RELEASE ORAL DAILY
Qty: 30 CAPSULE | Refills: 0 | Status: SHIPPED | OUTPATIENT
Start: 2024-04-05 | End: 2024-06-04

## 2024-04-05 NOTE — TELEPHONE ENCOUNTER
Lov 3/6/24  Lrf 30 0 3/6/24 Medication:   Requested Prescriptions     Pending Prescriptions Disp Refills    amphetamine-dextroamphetamine (ADDERALL XR) 15 MG extended release capsule 30 capsule 0     Sig: Take 1 capsule by mouth daily for 60 days.        Last Filled:      Patient Phone Number: [phone] (home)     Last appt: 3/6/2024   Next appt: Visit date not found    Last OARRS:       6/9/2023    11:30 AM   RX Monitoring   Periodic Controlled Substance Monitoring No signs of potential drug abuse or diversion identified.

## 2024-05-03 ENCOUNTER — E-VISIT (OUTPATIENT)
Dept: PRIMARY CARE CLINIC | Age: 34
End: 2024-05-03

## 2024-05-03 DIAGNOSIS — M54.9 ACUTE BACK PAIN, UNSPECIFIED BACK LOCATION, UNSPECIFIED BACK PAIN LATERALITY: Primary | ICD-10-CM

## 2024-05-03 RX ORDER — CYCLOBENZAPRINE HCL 10 MG
10 TABLET ORAL 3 TIMES DAILY PRN
Qty: 21 TABLET | Refills: 0 | Status: SHIPPED | OUTPATIENT
Start: 2024-05-03 | End: 2024-05-13

## 2024-05-03 NOTE — PROGRESS NOTES
Braydon Marcial (1990) initiated an asynchronous digital communication through [employer].    HPI: per patient questionnaire     Exam: not applicable    Diagnoses and all orders for this visit:  Diagnoses and all orders for this visit:    Acute back pain, unspecified back location, unspecified back pain laterality    Other orders  -     cyclobenzaprine (FLEXERIL) 10 MG tablet; Take 1 tablet by mouth 3 times daily as needed for Muscle spasms    Muscle relaxer sent. Supportive care. F/u with pcp if no improvement       Time: EV2 - 11-20 minutes were spent on the digital evaluation and management of this patient. 18 min     ABI Campos - CNP

## 2024-05-06 DIAGNOSIS — F41.9 ANXIETY AND DEPRESSION: ICD-10-CM

## 2024-05-06 DIAGNOSIS — F90.0 ATTENTION DEFICIT HYPERACTIVITY DISORDER (ADHD), PREDOMINANTLY INATTENTIVE TYPE: ICD-10-CM

## 2024-05-06 DIAGNOSIS — F32.A ANXIETY AND DEPRESSION: ICD-10-CM

## 2024-05-06 RX ORDER — DEXTROAMPHETAMINE SACCHARATE, AMPHETAMINE ASPARTATE MONOHYDRATE, DEXTROAMPHETAMINE SULFATE AND AMPHETAMINE SULFATE 3.75; 3.75; 3.75; 3.75 MG/1; MG/1; MG/1; MG/1
15 CAPSULE, EXTENDED RELEASE ORAL DAILY
Qty: 30 CAPSULE | Refills: 0 | Status: SHIPPED | OUTPATIENT
Start: 2024-05-06 | End: 2024-07-05

## 2024-05-06 RX ORDER — SERTRALINE HYDROCHLORIDE 100 MG/1
100 TABLET, FILM COATED ORAL DAILY
Qty: 90 TABLET | Refills: 0 | Status: SHIPPED | OUTPATIENT
Start: 2024-05-06

## 2024-05-06 NOTE — TELEPHONE ENCOUNTER
Lov 3/6/24  Lrf 30 0 4/5/24   90 0 1/5/24 Medication:   Requested Prescriptions     Pending Prescriptions Disp Refills    sertraline (ZOLOFT) 100 MG tablet 90 tablet 0     Sig: Take 1 tablet by mouth daily    amphetamine-dextroamphetamine (ADDERALL XR) 15 MG extended release capsule 30 capsule 0     Sig: Take 1 capsule by mouth daily for 60 days.        Last Filled:      Patient Phone Number: [phone] (home)     Last appt: 3/6/2024   Next appt: Visit date not found    Last OARRS:       4/5/2024    10:25 AM   RX Monitoring   Periodic Controlled Substance Monitoring No signs of potential drug abuse or diversion identified.

## 2024-05-21 DIAGNOSIS — R05.9 COUGH: ICD-10-CM

## 2024-05-21 RX ORDER — ALBUTEROL SULFATE 90 UG/1
2 AEROSOL, METERED RESPIRATORY (INHALATION) EVERY 4 HOURS PRN
Qty: 18 G | Refills: 2 | Status: SHIPPED | OUTPATIENT
Start: 2024-05-21

## 2024-05-21 NOTE — TELEPHONE ENCOUNTER
Medication:   Requested Prescriptions     Pending Prescriptions Disp Refills    albuterol sulfate HFA (VENTOLIN HFA) 108 (90 Base) MCG/ACT inhaler 18 g 2     Sig: Inhale 2 puffs into the lungs every 4 hours as needed for Wheezing for wheezing        Last Filled:  10/16/2023, 18 g, 2    Patient Phone Number: [phone] (home)     Last appt: 3/6/2024   Next appt: Visit date not found    Last OARRS:       5/6/2024    12:06 PM   RX Monitoring   Periodic Controlled Substance Monitoring No signs of potential drug abuse or diversion identified.

## 2024-06-03 DIAGNOSIS — F90.0 ATTENTION DEFICIT HYPERACTIVITY DISORDER (ADHD), PREDOMINANTLY INATTENTIVE TYPE: ICD-10-CM

## 2024-06-03 RX ORDER — DEXTROAMPHETAMINE SACCHARATE, AMPHETAMINE ASPARTATE MONOHYDRATE, DEXTROAMPHETAMINE SULFATE AND AMPHETAMINE SULFATE 3.75; 3.75; 3.75; 3.75 MG/1; MG/1; MG/1; MG/1
15 CAPSULE, EXTENDED RELEASE ORAL DAILY
Qty: 30 CAPSULE | Refills: 0 | Status: SHIPPED | OUTPATIENT
Start: 2024-06-03 | End: 2024-08-02

## 2024-06-03 NOTE — TELEPHONE ENCOUNTER
Lov 3/6/24  Lrf 30 0 5/6/24 Medication:   Requested Prescriptions     Pending Prescriptions Disp Refills    amphetamine-dextroamphetamine (ADDERALL XR) 15 MG extended release capsule 30 capsule 0     Sig: Take 1 capsule by mouth daily for 60 days.        Last Filled:      Patient Phone Number: [phone] (home)     Last appt: 3/6/2024   Next appt: Visit date not found    Last OARRS:       5/6/2024    12:06 PM   RX Monitoring   Periodic Controlled Substance Monitoring No signs of potential drug abuse or diversion identified.

## 2024-06-17 ENCOUNTER — E-VISIT (OUTPATIENT)
Dept: PRIMARY CARE CLINIC | Age: 34
End: 2024-06-17
Payer: COMMERCIAL

## 2024-06-17 DIAGNOSIS — R21 RASH AND NONSPECIFIC SKIN ERUPTION: Primary | ICD-10-CM

## 2024-06-17 PROCEDURE — 99423 OL DIG E/M SVC 21+ MIN: CPT | Performed by: NURSE PRACTITIONER

## 2024-06-17 RX ORDER — PREDNISONE 20 MG/1
TABLET ORAL
Qty: 18 TABLET | Refills: 0 | Status: SHIPPED | OUTPATIENT
Start: 2024-06-17 | End: 2024-06-27

## 2024-06-17 NOTE — PROGRESS NOTES
Braydon Marcial (1990) initiated an asynchronous digital communication through [employer].    HPI: per patient questionnaire     Exam: not applicable    Diagnoses and all orders for this visit:  Diagnoses and all orders for this visit:    Rash and nonspecific skin eruption    Other orders  -     predniSONE (DELTASONE) 20 MG tablet; 3 tabs x 3 days, then 2 tabs x 3 days, then 1 tab x 3 days      Reviewed photo. C/o rash worsening. Sent in steroid. Encourage f/u with pcp     Time: EV3 - 21 or more minutes were spent on the digital evaluation and management of this patient.23 min     ABI Campos - CNP

## 2024-07-04 DIAGNOSIS — F90.0 ATTENTION DEFICIT HYPERACTIVITY DISORDER (ADHD), PREDOMINANTLY INATTENTIVE TYPE: ICD-10-CM

## 2024-07-05 RX ORDER — DEXTROAMPHETAMINE SACCHARATE, AMPHETAMINE ASPARTATE MONOHYDRATE, DEXTROAMPHETAMINE SULFATE AND AMPHETAMINE SULFATE 3.75; 3.75; 3.75; 3.75 MG/1; MG/1; MG/1; MG/1
15 CAPSULE, EXTENDED RELEASE ORAL DAILY
Qty: 30 CAPSULE | Refills: 0 | Status: SHIPPED | OUTPATIENT
Start: 2024-07-05 | End: 2024-09-03

## 2024-07-05 NOTE — TELEPHONE ENCOUNTER
Medication:   Requested Prescriptions     Pending Prescriptions Disp Refills    amphetamine-dextroamphetamine (ADDERALL XR) 15 MG extended release capsule 30 capsule 0     Sig: Take 1 capsule by mouth daily for 60 days.        Last Filled:  06/03/2024    Patient Phone Number: [phone] (home)     Last appt: 3/6/2024   Next appt: 7/22/2024    Last OARRS:       6/3/2024    12:40 PM   RX Monitoring   Periodic Controlled Substance Monitoring No signs of potential drug abuse or diversion identified.

## 2024-07-24 ENCOUNTER — TELEPHONE (OUTPATIENT)
Dept: BARIATRICS/WEIGHT MGMT | Age: 34
End: 2024-07-24

## 2024-08-07 DIAGNOSIS — F32.A ANXIETY AND DEPRESSION: ICD-10-CM

## 2024-08-07 DIAGNOSIS — F90.0 ATTENTION DEFICIT HYPERACTIVITY DISORDER (ADHD), PREDOMINANTLY INATTENTIVE TYPE: ICD-10-CM

## 2024-08-07 DIAGNOSIS — F41.9 ANXIETY AND DEPRESSION: ICD-10-CM

## 2024-08-08 ENCOUNTER — TELEPHONE (OUTPATIENT)
Dept: BARIATRICS/WEIGHT MGMT | Age: 34
End: 2024-08-08

## 2024-08-08 RX ORDER — DEXTROAMPHETAMINE SACCHARATE, AMPHETAMINE ASPARTATE MONOHYDRATE, DEXTROAMPHETAMINE SULFATE AND AMPHETAMINE SULFATE 3.75; 3.75; 3.75; 3.75 MG/1; MG/1; MG/1; MG/1
15 CAPSULE, EXTENDED RELEASE ORAL DAILY
Qty: 30 CAPSULE | Refills: 0 | Status: SHIPPED | OUTPATIENT
Start: 2024-08-08 | End: 2024-10-07

## 2024-08-08 RX ORDER — SERTRALINE HYDROCHLORIDE 100 MG/1
100 TABLET, FILM COATED ORAL DAILY
Qty: 90 TABLET | Refills: 0 | Status: SHIPPED | OUTPATIENT
Start: 2024-08-08

## 2024-08-08 NOTE — TELEPHONE ENCOUNTER
Medication:   Requested Prescriptions     Pending Prescriptions Disp Refills    sertraline (ZOLOFT) 100 MG tablet 90 tablet 0     Sig: Take 1 tablet by mouth daily    amphetamine-dextroamphetamine (ADDERALL XR) 15 MG extended release capsule 30 capsule 0     Sig: Take 1 capsule by mouth daily for 60 days.        Last Filled:  05/06/2024    Patient Phone Number: [phone] (home)     Last appt: 3/6/2024   Next appt: 8/12/2024    Last OARRS:       7/5/2024     9:58 PM   RX Monitoring   Periodic Controlled Substance Monitoring No signs of potential drug abuse or diversion identified.

## 2025-02-12 ENCOUNTER — PATIENT MESSAGE (OUTPATIENT)
Dept: BARIATRICS/WEIGHT MGMT | Age: 35
End: 2025-02-12

## 2025-02-12 NOTE — TELEPHONE ENCOUNTER
Attempt to contact for Bariatric Follow Up. [employer] message sent and letter mailed to address on file in [facility]

## 2025-06-24 NOTE — TELEPHONE ENCOUNTER
Medication:   Requested Prescriptions     Pending Prescriptions Disp Refills    amphetamine-dextroamphetamine (ADDERALL XR) 30 MG extended release capsule 30 capsule 0     Sig: Take 1 capsule by mouth daily for 30 days. Last Filled:  11/23/2022    Patient Phone Number: [phone] (home)     Last appt: 10/6/2022   Next appt: Visit date not found    Last OARRS:   RX Monitoring 9/16/2022   Attestation -   Periodic Controlled Substance Monitoring No signs of potential drug abuse or diversion identified. Statement Selected

## (undated) DEVICE — SUTURE VCRL SZ 0 L54IN ABSRB VLT W/O NDL POLYGLACTIN 910 J616H

## (undated) DEVICE — TROCAR: Brand: KII FIOS FIRST ENTRY

## (undated) DEVICE — VISIGI 3D®  CALIBRATION SYSTEM  SIZE 36FR STD W/ BULB: Brand: BOEHRINGER® VISIGI 3D™ SLEEVE GASTRECTOMY CALIBRATION SYSTEM, SIZE 36FR W/BULB

## (undated) DEVICE — BLADELESS OBTURATOR, LONG: Brand: WECK VISTA

## (undated) DEVICE — TISSUE RETRIEVAL SYSTEM: Brand: INZII RETRIEVAL SYSTEM

## (undated) DEVICE — 40583 XL ADVANCED TRENDELENBURG POSITIONING KIT: Brand: 40583 XL ADVANCED TRENDELENBURG POSITIONING KIT

## (undated) DEVICE — CANNULA SEAL

## (undated) DEVICE — BOWL MED L 32OZ PLAS W/ MOLD GRAD EZ OPN PEEL PCH

## (undated) DEVICE — GARMENT,MEDLINE,DVT,INT,CALF,LG, GEN2: Brand: MEDLINE

## (undated) DEVICE — SYSTEM SMK EVAC LAP TBNG FILTER HSNG BENT STYL PNK SEE CLR

## (undated) DEVICE — SYRINGE MED 10ML SLIP TIP BLNT FILL AND LUERLOCK DISP

## (undated) DEVICE — TOWEL,STOP FLAG GOLD N-W: Brand: MEDLINE

## (undated) DEVICE — 60 ML SYRINGE,CATHETER TIP: Brand: MONOJECT

## (undated) DEVICE — GLOVE ORANGE PI 7   MSG9070

## (undated) DEVICE — SUTURE ABSORBABLE MONOFILAMENT 2-0 SH 6 IN STRATAFIX SPRL SXPP1B415

## (undated) DEVICE — SPONGE,LAP,4"X18",XR,ST,5/PK,40PK/CS: Brand: MEDLINE INDUSTRIES, INC.

## (undated) DEVICE — LARGE NEEDLE DRIVER: Brand: ENDOWRIST

## (undated) DEVICE — ADHESIVE SKIN CLSR 0.7ML TOP DERMBND ADV

## (undated) DEVICE — NEEDLE INSUF L150MM DIA2MM DISP FOR PNEUMOPERI ENDOPATH

## (undated) DEVICE — AGENT HEMSTAT W2XL4IN OXIDIZED REGENERATED CELOS ABSRB

## (undated) DEVICE — STAPLER 60 RELOAD GREEN: Brand: SUREFORM

## (undated) DEVICE — GLOVE SURG SZ 75 L12IN THK75MIL DK GRN LTX FREE

## (undated) DEVICE — LAPAROSCOPIC SCISSORS: Brand: EPIX LAPAROSCOPIC SCISSORS

## (undated) DEVICE — SUTURE VCRL SZ 4-0 L18IN ABSRB UD L19MM PS-2 3/8 CIR PRIM J496H

## (undated) DEVICE — SYRINGE MED 10ML TRNSLUC BRL PLUNG BLK MRK POLYPR CTRL

## (undated) DEVICE — STAPLER 60: Brand: SUREFORM

## (undated) DEVICE — PROGRASP FORCEPS: Brand: ENDOWRIST

## (undated) DEVICE — ARM DRAPE

## (undated) DEVICE — DEVICE CLSR 10/12MM XL PRT SYS SUT PASS ST DISP CARTER

## (undated) DEVICE — STAPLER 60 RELOAD BLUE: Brand: SUREFORM

## (undated) DEVICE — ROBOTIC: Brand: MEDLINE INDUSTRIES, INC.

## (undated) DEVICE — BINDER ABD H12IN FOR 62-74IN WAIST UNIV 4 PNL PREM DSGN E

## (undated) DEVICE — SOLUTION ANTIFOG VIS SYS CLEARIFY LAPSCP

## (undated) DEVICE — NEEDLE HYPO 22GA L1.5IN BLK POLYPR HUB S STL REG BVL STR

## (undated) DEVICE — VESSEL SEALER EXTEND: Brand: ENDOWRIST

## (undated) DEVICE — CADIERE FORCEPS: Brand: ENDOWRIST

## (undated) DEVICE — PUMP SUC IRR TBNG L10FT W/ HNDPC ASSEMB STRYKEFLOW 2